# Patient Record
Sex: FEMALE | Race: WHITE | Employment: PART TIME | URBAN - METROPOLITAN AREA
[De-identification: names, ages, dates, MRNs, and addresses within clinical notes are randomized per-mention and may not be internally consistent; named-entity substitution may affect disease eponyms.]

---

## 2017-03-17 ENCOUNTER — OFFICE VISIT (OUTPATIENT)
Dept: FAMILY MEDICINE CLINIC | Age: 62
End: 2017-03-17

## 2017-03-17 VITALS
BODY MASS INDEX: 24.05 KG/M2 | DIASTOLIC BLOOD PRESSURE: 98 MMHG | HEART RATE: 79 BPM | RESPIRATION RATE: 16 BRPM | OXYGEN SATURATION: 98 % | HEIGHT: 64 IN | SYSTOLIC BLOOD PRESSURE: 151 MMHG | WEIGHT: 140.9 LBS | TEMPERATURE: 98.4 F

## 2017-03-17 DIAGNOSIS — R73.9 HYPERGLYCEMIA: ICD-10-CM

## 2017-03-17 DIAGNOSIS — R00.2 HEART PALPITATIONS: ICD-10-CM

## 2017-03-17 DIAGNOSIS — Z00.00 WELL WOMAN EXAM (NO GYNECOLOGICAL EXAM): Primary | ICD-10-CM

## 2017-03-17 DIAGNOSIS — Z23 ENCOUNTER FOR IMMUNIZATION: ICD-10-CM

## 2017-03-17 DIAGNOSIS — I10 ESSENTIAL HYPERTENSION: ICD-10-CM

## 2017-03-17 DIAGNOSIS — J30.89 SEASONAL ALLERGIC RHINITIS DUE TO OTHER ALLERGIC TRIGGER: ICD-10-CM

## 2017-03-17 DIAGNOSIS — M81.0 OSTEOPOROSIS: ICD-10-CM

## 2017-03-17 DIAGNOSIS — Z85.3 HISTORY OF LEFT BREAST CANCER: ICD-10-CM

## 2017-03-17 DIAGNOSIS — Z11.59 NEED FOR HEPATITIS C SCREENING TEST: ICD-10-CM

## 2017-03-17 DIAGNOSIS — E78.00 PURE HYPERCHOLESTEROLEMIA: ICD-10-CM

## 2017-03-17 RX ORDER — ASPIRIN 81 MG/1
81 TABLET ORAL DAILY
Qty: 90 TAB | Refills: 3
Start: 2017-03-17 | End: 2019-01-31

## 2017-03-17 RX ORDER — LORATADINE 10 MG/1
10 TABLET ORAL
COMMUNITY

## 2017-03-17 NOTE — PROGRESS NOTES
Chief Complaint   Patient presents with    Complete Physical    Medication Refill     Diltiazem    Blood Pressure Check    ED Follow-up     [facility]     1. Have you been to the ER, urgent care clinic since your last visit? Hospitalized since your last visit? Yes, [facility]. OV note located in Media section. PT stated that she had N/V taking the Z-pack that was given to her; so she stopped taking it. Added to her allergies per her request; does not want to ever take it again. 2. Have you seen or consulted any other health care providers outside of the [address] [place] [address] since your last visit? Include any pap smears or colon screening. Yes, PT went to [facility] on 01/05/2017    In the event something were to happen to you and you were unable to speak on your behalf, do you have an Advance Directive/ Living Will in place stating your wishes? PT unsure, knows she did a will, but does not know if it had a living will or adv care plan on it or not. If yes, do we have a copy on file NO    If no, would you like information:    PT offered and accepted. PT stated that she has an appointment for her colonoscopy in the near future; stated that she had not had an eye exam done recently as well. PT's Mammogram due 08/23/2018 and Pap Smear due 07/28/2017.

## 2017-03-17 NOTE — ACP (ADVANCE CARE PLANNING)
Discussed ACP with patient. Gave pt an [facility] Patient prefers to read it on her own. Declines referral to [facility] team at this time. Patient will bring document to her next office visit or attach it to her MyChart record.

## 2017-03-17 NOTE — PROGRESS NOTES
HISTORY OF PRESENT ILLNESS  Bhavani Rene is a 57 y.o. female here for an annual physical exam    Agree with nurse note. I have not seen Ms. Seng Faulkner since 03/02/16. Hypertensive, hyperglycemic pt with hypercholesterolemia and hx of heart palpitations presents to the office with a BP of 151/98. For BP, she takes Diltiazem 300 mg ER daily, tolerating well. Patient denies vision changes, headaches, dizziness, chest pain, SOB, or swelling. No recurrence of heart palpitations. She weighs 140 lbs, lost 1 lb since last ov. Pt with seasonal allergies; stable on Claritin and Nasacort daily. No fever, chills, SOB, chest pain or other associated symptoms. Health Maintenance    Pt's most recent colonoscopy was on 6/24/10 with GI, Dr. Alisson Castle. F/U 10 years. Pt is followed by optometrist, Dr. Lakia Khan. No     Pt with osteoporosis. She was previously followed by Dr. Trent Alexander who has since retired. Declines intervention today. She takes Calcium 500 mg daily. Pt is followed by GYN, Dr. Ministerio Torrez and will follow up with her this summer. Her most recent pap smear was on 7/28/14; normal.     Pt with hx of L breast cancer; no recurrence. She was previously followed by Dr. Any Pablo who performed her L breast lumpectomy. She has since been released from her care. Her most recent mammogram was on 8/23/16; normal with fibroglandular densities noted. Pt is agreeable with Hep C screening. Written by kathryn Mcgrath, as dictated by Dr. Romy Barillas DO.    MARIAM    Review of Systems is negative except as mentioned above in HPI.     ALLERGIES:    Allergies   Allergen Reactions    Percocet [Oxycodone-Acetaminophen] Nausea and Vomiting     dizziness    Prednisone Nausea and Vomiting     \"withdrawals\" and insomnia    Azithromycin Nausea and Vomiting       CURRENT MEDICATIONS:    Outpatient Prescriptions Marked as Taking for the 3/17/17 encounter (Office Visit) with Ewa Collins Maria A Herrera, DO   Medication Sig Dispense Refill    loratadine (CLARITIN) 10 mg tablet Take 10 mg by mouth.  aspirin delayed-release 81 mg tablet Take 1 Tab by mouth daily. Indications: prevention of transient ischemic attack 90 Tab 3    diltiazem CD (CARDIZEM CD) 300 mg ER capsule TAKE 1 CAPSULE BY MOUTH DAILY. INDICATIONS: HYPERTENSION, HEART PALPITATIONS 90 Cap 3    triamcinolone (NASACORT AQ) 55 mcg nasal inhaler 2 Sprays daily.  calcium 500 mg tab Take  by mouth. Unsure of exact dose.  multivitamin (ONE A DAY) tablet Take 1 Tab by mouth daily. PAST MEDICAL HISTORY:    Past Medical History:   Diagnosis Date    Breast cancer, stage 1 ([facility] [address] 2003    Ductal Carcinoma In Situ. Radiation and surgery. Dr. Vicki Lechuga. Dr. Jesus Davis.  Chest pain 07/2007    Negative Cardiolite Stress Test.  Dr. Carmen Acosta Essential hypertension, benign 07/26/07    Dr. Carmen Acosta Fracture of foot 4/30/15    5th metatarsal fracture left foot. Dr. Chriss Amin. [employer].  Heart palpitations 07/2007    Dr. Townsend Brothers disease     Dr. Raf Zee Metatarsal fracture 12/2006    Right 5th.  Osteoporosis 08/20/08    Dr. Princess Maxwell    Stress fracture of lower leg 06/11/08    Right distal stress reaction. PAST SURGICAL HISTORY:    Past Surgical History:   Procedure Laterality Date    HX BREAST LUMPECTOMY  2003    left due to cancer with radiation    HX COLONOSCOPY  06/24/2010    Dr. Nora Owusu. due q 10 yrs.     HX POLYPECTOMY  04/2008    uterine  Dr. Megan Addison  childhood    Charol Noun    Dr. Marilou Lofton       FAMILY HISTORY:    Family History   Problem Relation Age of Onset    Heart Attack Mother 72    Heart Disease Mother      CAD    High Cholesterol Mother     Other Mother      advanced Meniere's Disease    Cancer Mother 80     LUNG    Diabetes Father     High Cholesterol Sister     Seizures Sister    [address] [facility] Other Sister      MACULAR DEGENERATION    Breast Cancer Paternal Grandmother      pt was in her [de-identified]       SOCIAL HISTORY:    Social History     Social History    Marital status:      Spouse name: N/A    Number of children: N/A    Years of education: N/A     Social History Main Topics    Smoking status: Former Smoker     Types: Cigarettes     Quit date: 1/1/1972    Smokeless tobacco: Never Used      Comment: smoked x6 mos at 21 y/o    Alcohol use No    Drug use: No    Sexual activity: Yes     Partners: Male     Birth control/ protection: Surgical      Comment: TL     Other Topics Concern    None     Social History Narrative       IMMUNIZATIONS:    Immunization History   Administered Date(s) Administered    H1N1 Influenza Virus Vaccine 11/01/2009    Influenza Vaccine 10/13/2015, 01/05/2016    Influenza Vaccine Split 11/01/2010, 11/01/2011    Influenza Vaccine Whole 09/01/2009    TD Vaccine 08/01/2009         PHYSICAL EXAMINATION    Vital signs     Visit Vitals    BP (!) 151/98 (BP 1 Location: Left arm, BP Patient Position: Sitting)    Pulse 79    Temp 98.4 °F (36.9 °C) (Oral)    Resp 16    Ht 5' 3.5\" (1.613 m)    Wt 140 lb 14.4 oz (63.9 kg)    SpO2 98%    BMI 24.56 kg/m2        General appearance - Well nourished. Well appearing. Well developed. No acute distress. Head - Normocephalic. Atraumatic. Non tender sinuses x 4. Eyes - pupils equal and reactive, extraocular eye movements intact, sclera anicteric. Mildly injected sclera. Ears - Hearing is grossly normal bilaterally. R EAC is mildly erythematous at inferior aspect. BL moderate retracted TMs with yellow discoloration with good reflection to light; no erythema. Nose - normal and patent. No erythema or turbinate edema. No discharge. No polyps. Mouth - mucous membranes with adequate moisture. Posterior pharynx normal without lesions, white exudate, or obstruction. Neck - supple. Midline trachea.   No carotid bruits are noted.  No thyromegaly noted. Neck is supple without rigidity. Chest - clear to auscultation bilaterally anterriorly and posteriorly. No wheezes, rales or rhonchi. Breath sounds are symmetrical bilaterally. Unlabored respirations. Heart - normal rate. Regular rhythm. Normal S1, S2. No murmurs. No rubs, clicks or gallops noted. Abdomen - soft and nondistended. No masses or organomegaly. No rebound, rigidity or guarding. Bowel sounds normal x 4 quadrants. No tenderness noted. Back exam - normal range of motion. No pain on palpation of the spinous processes in the cervical, thoracic, lumbar, sacral regions. No CVA tenderness. Mild increased thoracic kyphosis. Neurological - awake, alert and oriented to person, place, and time and event. Cranial nerves II through XII intact. No focal findings. Clear speech. Muscle strength is +5/5 x 4 extremities. Sensation is intact to light touch bilaterally. Steady gait. Musculoskeletal - Intact x 4 extremities. Full ROM x 4 extremities. No pain with movement. No pain on palpation of the bilateral shoulders, elbows, wrists, hands. No tenderness in the pelvis, pubic bone, bilateral hips, knees, ankles. No obvious deformity  Heme/Lymph - peripheral pulses normal x 4 extremities. +5/5 dp. No peripheral edema is noted. No cervical adenopathy noted. Skin - no rashes, erythema, ecchymosis, lacerations, abrasions, suspicious moles  Psychological -   normal behavior, dress and thought processes. Good insight. Good eye contact. Normal affect. Appropriate mood. Normal speech.       DATA REVIEWED    Lab Results   Component Value Date/Time    WBC 5.1 07/29/2014 09:33 AM    HGB 12.5 07/29/2014 09:33 AM    HCT 38.6 07/29/2014 09:33 AM    PLATELET 399 13/32/6408 09:33 AM    MCV 89 07/29/2014 09:33 AM     Lab Results   Component Value Date/Time    Sodium 142 07/29/2014 09:33 AM    Potassium 4.4 07/29/2014 09:33 AM    Chloride 100 07/29/2014 09:33 AM CO2 29 07/29/2014 09:33 AM    Glucose 82 07/29/2014 09:33 AM    BUN 18 07/29/2014 09:33 AM    Creatinine 0.78 07/29/2014 09:33 AM    BUN/Creatinine ratio 23 07/29/2014 09:33 AM    GFR est AA 96 07/29/2014 09:33 AM    GFR est non-AA 83 07/29/2014 09:33 AM    Calcium 9.4 07/29/2014 09:33 AM    Bilirubin, total 0.2 07/29/2014 09:33 AM    AST (SGOT) 15 07/29/2014 09:33 AM    Alk. phosphatase 72 07/29/2014 09:33 AM    Protein, total 6.3 07/29/2014 09:33 AM    Albumin 4.3 07/29/2014 09:33 AM    A-G Ratio 2.2 07/29/2014 09:33 AM    ALT (SGPT) 15 07/29/2014 09:33 AM     Lab Results   Component Value Date/Time    Cholesterol, total 210 07/29/2014 09:33 AM    HDL Cholesterol 86 07/29/2014 09:33 AM    LDL, calculated 112 07/29/2014 09:33 AM    VLDL, calculated 12 07/29/2014 09:33 AM    Triglyceride 58 07/29/2014 09:33 AM     Lab Results   Component Value Date/Time    Vitamin D 25-Hydroxy 37.1 06/20/2011 08:19 AM    VITAMIN D, 25-HYDROXY 42.7 07/29/2014 09:33 AM       Lab Results   Component Value Date/Time    Hemoglobin A1c 5.9 07/29/2014 09:33 AM     Lab Results   Component Value Date/Time    TSH 1.200 07/29/2014 09:33 AM       ASSESSMENT and PLAN    ICD-10-CM ICD-9-CM    1. Well woman exam (no gynecological exam) Z00.00 V70.0 LIPID PANEL      METABOLIC PANEL, COMPREHENSIVE      TSH 3RD GENERATION      URINALYSIS W/ RFLX MICROSCOPIC      CBC W/O DIFF      aspirin delayed-release 81 mg tablet      MICROALBUMIN, UR, RAND W/ MICROALBUMIN/CREA RATIO   2. Pure hypercholesterolemia E78.00 272.0 LIPID PANEL      METABOLIC PANEL, COMPREHENSIVE      TSH 3RD GENERATION      URINALYSIS W/ RFLX MICROSCOPIC   3. Essential hypertension I10 401.9 LIPID PANEL      METABOLIC PANEL, COMPREHENSIVE      TSH 3RD GENERATION      MICROALBUMIN, UR, RAND W/ MICROALBUMIN/CREA RATIO   4. Hyperglycemia H84.6 825.59 METABOLIC PANEL, COMPREHENSIVE      HEMOGLOBIN A1C WITH EAG   5.  Osteoporosis M81.0 733.00 VITAMIN D, 25 HYDROXY   6. Heart palpitations R00.2 785.1     stable   7. History of left breast cancer Z85.3 V10.3     stage 1   8. Need for hepatitis C screening test Z11.59 V73.89 HCV AB W/RFLX TO JAYLEN   9. Seasonal allergic rhinitis due to other allergic trigger J30.89  loratadine (CLARITIN) 10 mg tablet     See ophthalmologist every 2 to 3 years unless otherwise directed. See dentist every 6 months. See dermatologist for annual check. Staff message sent to oncologist, Dr. Carleen Truong for curbside consult regarding shingles and TDAP vaccines after hx of breast cancer and radiation. Per pt request, will refer BMD testing to GYN in the absence of Dr. Lesli Lugo who retired. Prescriptions written and sent to pharmacist or given to patient. Diltiazem 300 mg ER. Continue current medications. Start Aspirin 81 mg daily for stroke prevention. Chart reviewed and updated. Specialist notes reviewed and appreciated. Get ov notes from Dr. Liz Bateman. Advised pt to sign medical release form at check out today. Recheck pertinent labs when fasting. Recent colonoscopy, PAP and mammograms reviewed. Recheck as instructed by specialists. Immunizations are noted. Administered pneumococcal vaccine today. Advised balanced diet and exercise. Proper rest.  Increase water and fiber. Avoid tobacco.  Decrease alcohol and caffeine. Decrease carbohydrates, increase green leafy vegetables and protein. Increase water intake. Eat 3-5 small meals daily. Increase physical activity. Relevant handouts provided and discussed with pt. Counselled pt on:  Patient health concerns. Osteoporosis, hx of L breast cancer, and BP. Advance Care Booklet given at office visit. Discussed with pt today. Declines honoring choices referral.  Discussed the patient's BMI with her. The BMI follow up plan is as follows: Pt not eligible for BMI calculation due to normal weight. Follow-up Disposition:  Return in about 6 months (around 9/17/2017) for bp, results .     Patient was offered a choice/choices in the treatment plan today. Patient expresses understanding of the plan and agrees with recommendations. Written by kathryn Alonzo, as dictated by Dr. Aura Lee DO. Documentation True and Accepted by Miya Fischer. Gerard Peck. Patient Instructions        DASH Diet: Care Instructions  Your Care Instructions  The DASH diet is an eating plan that can help lower your blood pressure. DASH stands for Dietary Approaches to Stop Hypertension. Hypertension is high blood pressure. The DASH diet focuses on eating foods that are high in calcium, potassium, and magnesium. These nutrients can lower blood pressure. The foods that are highest in these nutrients are fruits, vegetables, low-fat dairy products, nuts, seeds, and legumes. But taking calcium, potassium, and magnesium supplements instead of eating foods that are high in those nutrients does not have the same effect. The DASH diet also includes whole grains, fish, and poultry. The DASH diet is one of several lifestyle changes your doctor may recommend to lower your high blood pressure. Your doctor may also want you to decrease the amount of sodium in your diet. Lowering sodium while following the DASH diet can lower blood pressure even further than just the DASH diet alone. Follow-up care is a key part of your treatment and safety. Be sure to make and go to all appointments, and call your doctor if you are having problems. It's also a good idea to know your test results and keep a list of the medicines you take. How can you care for yourself at home? Following the DASH diet  · Eat 4 to 5 servings of fruit each day. A serving is 1 medium-sized piece of fruit, ½ cup chopped or canned fruit, 1/4 cup dried fruit, or 4 ounces (½ cup) of fruit juice. Choose fruit more often than fruit juice. · Eat 4 to 5 servings of vegetables each day.  A serving is 1 cup of lettuce or raw leafy vegetables, ½ cup of chopped or cooked vegetables, or 4 ounces (½ cup) of vegetable juice. Choose vegetables more often than vegetable juice. · Get 2 to 3 servings of low-fat and fat-free dairy each day. A serving is 8 ounces of milk, 1 cup of yogurt, or 1 ½ ounces of cheese. · Eat 6 to 8 servings of grains each day. A serving is 1 slice of bread, 1 ounce of dry cereal, or ½ cup of cooked rice, pasta, or cooked cereal. Try to choose whole-grain products as much as possible. · Limit lean meat, poultry, and fish to 2 servings each day. A serving is 3 ounces, about the size of a deck of cards. · Eat 4 to 5 servings of nuts, seeds, and legumes (cooked dried beans, lentils, and split peas) each week. A serving is 1/3 cup of nuts, 2 tablespoons of seeds, or ½ cup of cooked beans or peas. · Limit fats and oils to 2 to 3 servings each day. A serving is 1 teaspoon of vegetable oil or 2 tablespoons of salad dressing. · Limit sweets and added sugars to 5 servings or less a week. A serving is 1 tablespoon jelly or jam, ½ cup sorbet, or 1 cup of lemonade. · Eat less than 2,300 milligrams (mg) of sodium a day. If you limit your sodium to 1,500 mg a day, you can lower your blood pressure even more. Tips for success  · Start small. Do not try to make dramatic changes to your diet all at once. You might feel that you are missing out on your favorite foods and then be more likely to not follow the plan. Make small changes, and stick with them. Once those changes become habit, add a few more changes. · Try some of the following:  ¨ Make it a goal to eat a fruit or vegetable at every meal and at snacks. This will make it easy to get the recommended amount of fruits and vegetables each day. ¨ Try yogurt topped with fruit and nuts for a snack or healthy dessert. ¨ Add lettuce, tomato, cucumber, and onion to sandwiches. ¨ Combine a ready-made pizza crust with low-fat mozzarella cheese and lots of vegetable toppings.  Try using tomatoes, squash, spinach, broccoli, carrots, cauliflower, and onions. ¨ Have a variety of cut-up vegetables with a low-fat dip as an appetizer instead of chips and dip. ¨ Sprinkle sunflower seeds or chopped almonds over salads. Or try adding chopped walnuts or almonds to cooked vegetables. ¨ Try some vegetarian meals using beans and peas. Add garbanzo or kidney beans to salads. Make burritos and tacos with mashed mukherjee beans or black beans. Where can you learn more? Go to http://[employer]kandi.info/. Enter J640 in the search box to learn more about \"DASH Diet: Care Instructions. \"  Current as of: March 23, 2016  Content Version: 11.1  © 2750-6618 [employer]. Care instructions adapted under license by [employer] (which disclaims liability or warranty for this information). If you have questions about a medical condition or this instruction, always ask your healthcare professional. [address] any warranty or liability for your use of this information.

## 2017-03-17 NOTE — MR AVS SNAPSHOT
Visit Information Date & Time Provider Department Dept. Phone Encounter #  
 3/17/2017  9:15 AM Tim Fischer DO [facility] [phone] [id number] Follow-up Instructions Return in about 6 months (around 9/17/2017) for bp, results . Routing History Upcoming Health Maintenance Date Due Hepatitis C Screening 7/13/2017* DTaP/Tdap/Td series (1 - Tdap) 8/17/2017* ZOSTER VACCINE AGE 60> 9/14/2017* PAP AKA CERVICAL CYTOLOGY 7/28/2017 Pneumococcal 19-64 Highest Risk (2 of 3 - PCV13) 3/17/2018 BREAST CANCER SCRN MAMMOGRAM 8/23/2018 COLONOSCOPY 4/1/2021 *Topic was postponed. The date shown is not the original due date. Allergies as of 3/17/2017  Review Complete On: 3/17/2017 By: Tim Fischer DO Severity Noted Reaction Type Reactions Percocet [Oxycodone-acetaminophen]  01/15/2010    Nausea and Vomiting  
 dizziness Prednisone  01/15/2010    Nausea and Vomiting \"withdrawals\" and insomnia Azithromycin Low 03/17/2017   Not Verified Nausea and Vomiting Current Immunizations  Reviewed on 3/17/2017 Name Date H1N1 FLU VACCINE 11/1/2009 Influenza Vaccine 1/5/2016, 10/13/2015 Influenza Vaccine Split 11/1/2011, 11/1/2010 Influenza Vaccine Whole 9/1/2009 TD Vaccine 8/1/2009 Reviewed by Tim Fischer DO on 3/17/2017 at 10:28 AM  
You Were Diagnosed With   
  
 Codes Comments Well woman exam (no gynecological exam)    -  Primary ICD-10-CM: Z00.00 ICD-9-CM: V70.0 Pure hypercholesterolemia     ICD-10-CM: E78.00 ICD-9-CM: 272.0 Essential hypertension     ICD-10-CM: I10 
ICD-9-CM: 401.9 Hyperglycemia     ICD-10-CM: R73.9 ICD-9-CM: 790.29 Osteoporosis     ICD-10-CM: M81.0 ICD-9-CM: 733.00 Heart palpitations     ICD-10-CM: R00.2 ICD-9-CM: 785.1 stable History of left breast cancer     ICD-10-CM: Z85.3 ICD-9-CM: V10.3 stage 1 Need for hepatitis C screening test     ICD-10-CM: Z11.59 
ICD-9-CM: V73.89 Seasonal allergic rhinitis due to other allergic trigger     ICD-10-CM: J30.89 Vitals BP Pulse Temp Resp Height(growth percentile) Weight(growth percentile) (!) 151/98 (BP 1 Location: Left arm, BP Patient Position: Sitting) 79 98.4 °F (36.9 °C) (Oral) 16 5' 3.5\" (1.613 m) 140 lb 14.4 oz (63.9 kg) SpO2 BMI OB Status Smoking Status 98% 24.56 kg/m2 Postmenopausal Former Smoker Vitals History BMI and BSA Data Body Mass Index Body Surface Area 24.56 kg/m 2 1.69 m 2 Preferred Pharmacy Pharmacy Name Phone [id number] [phone] Your Updated Medication List  
  
   
This list is accurate as of: 3/17/17 10:51 AM.  Always use your most recent med list.  
  
  
  
  
 aspirin delayed-release 81 mg tablet Take 1 Tab by mouth daily. Indications: prevention of transient ischemic attack  
  
 calcium 500 mg Tab Take  by mouth. Unsure of exact dose. CLARITIN 10 mg tablet Generic drug:  loratadine Take 10 mg by mouth. dilTIAZem  mg ER capsule Commonly known as:  CARDIZEM CD  
TAKE 1 CAPSULE BY MOUTH DAILY. INDICATIONS: HYPERTENSION, HEART PALPITATIONS  
  
 multivitamin tablet Commonly known as:  ONE A DAY Take 1 Tab by mouth daily. NASACORT AQ 55 mcg nasal inhaler Generic drug:  triamcinolone 2 Sprays daily. We Performed the Following CBC W/O DIFF [55676 CPT(R)] HCV AB W/RFLX TO JAYLEN [01564 CPT(R)] HEMOGLOBIN A1C WITH EAG [52068 CPT(R)] LIPID PANEL [81978 CPT(R)] METABOLIC PANEL, COMPREHENSIVE [45132 CPT(R)] MICROALBUMIN, UR, RAND W/ MICROALBUMIN/CREA RATIO [id number] CPT(R)] TSH 3RD GENERATION [85323 CPT(R)] URINALYSIS W/ RFLX MICROSCOPIC [86975 CPT(R)] VITAMIN D, 25 HYDROXY [id number] CPT(R)] Follow-up Instructions Return in about 6 months (around 9/17/2017) for bp, results . Patient Instructions DASH Diet: Care Instructions Your Care Instructions The DASH diet is an eating plan that can help lower your blood pressure. DASH stands for Dietary Approaches to Stop Hypertension. Hypertension is high blood pressure. The DASH diet focuses on eating foods that are high in calcium, potassium, and magnesium. These nutrients can lower blood pressure. The foods that are highest in these nutrients are fruits, vegetables, low-fat dairy products, nuts, seeds, and legumes. But taking calcium, potassium, and magnesium supplements instead of eating foods that are high in those nutrients does not have the same effect. The DASH diet also includes whole grains, fish, and poultry. The DASH diet is one of several lifestyle changes your doctor may recommend to lower your high blood pressure. Your doctor may also want you to decrease the amount of sodium in your diet. Lowering sodium while following the DASH diet can lower blood pressure even further than just the DASH diet alone. Follow-up care is a key part of your treatment and safety. Be sure to make and go to all appointments, and call your doctor if you are having problems. It's also a good idea to know your test results and keep a list of the medicines you take. How can you care for yourself at home? Following the DASH diet · Eat 4 to 5 servings of fruit each day. A serving is 1 medium-sized piece of fruit, ½ cup chopped or canned fruit, 1/4 cup dried fruit, or 4 ounces (½ cup) of fruit juice. Choose fruit more often than fruit juice. · Eat 4 to 5 servings of vegetables each day. A serving is 1 cup of lettuce or raw leafy vegetables, ½ cup of chopped or cooked vegetables, or 4 ounces (½ cup) of vegetable juice. Choose vegetables more often than vegetable juice. · Get 2 to 3 servings of low-fat and fat-free dairy each day.  A serving is 8 ounces of milk, 1 cup of yogurt, or 1 ½ ounces of cheese. · Eat 6 to 8 servings of grains each day. A serving is 1 slice of bread, 1 ounce of dry cereal, or ½ cup of cooked rice, pasta, or cooked cereal. Try to choose whole-grain products as much as possible. · Limit lean meat, poultry, and fish to 2 servings each day. A serving is 3 ounces, about the size of a deck of cards. · Eat 4 to 5 servings of nuts, seeds, and legumes (cooked dried beans, lentils, and split peas) each week. A serving is 1/3 cup of nuts, 2 tablespoons of seeds, or ½ cup of cooked beans or peas. · Limit fats and oils to 2 to 3 servings each day. A serving is 1 teaspoon of vegetable oil or 2 tablespoons of salad dressing. · Limit sweets and added sugars to 5 servings or less a week. A serving is 1 tablespoon jelly or jam, ½ cup sorbet, or 1 cup of lemonade. · Eat less than 2,300 milligrams (mg) of sodium a day. If you limit your sodium to 1,500 mg a day, you can lower your blood pressure even more. Tips for success · Start small. Do not try to make dramatic changes to your diet all at once. You might feel that you are missing out on your favorite foods and then be more likely to not follow the plan. Make small changes, and stick with them. Once those changes become habit, add a few more changes. · Try some of the following: ¨ Make it a goal to eat a fruit or vegetable at every meal and at snacks. This will make it easy to get the recommended amount of fruits and vegetables each day. ¨ Try yogurt topped with fruit and nuts for a snack or healthy dessert. ¨ Add lettuce, tomato, cucumber, and onion to sandwiches. ¨ Combine a ready-made pizza crust with low-fat mozzarella cheese and lots of vegetable toppings. Try using tomatoes, squash, spinach, broccoli, carrots, cauliflower, and onions. ¨ Have a variety of cut-up vegetables with a low-fat dip as an appetizer instead of chips and dip. ¨ Sprinkle sunflower seeds or chopped almonds over salads. Or try adding chopped walnuts or almonds to cooked vegetables. ¨ Try some vegetarian meals using beans and peas. Add garbanzo or kidney beans to salads. Make burritos and tacos with mashed mukherjee beans or black beans. Where can you learn more? Go to http://margo-kandi.info/. Enter G811 in the search box to learn more about \"DASH Diet: Care Instructions. \" Current as of: March 23, 2016 Content Version: 11.1 © 0449-4227 [employer]. Care instructions adapted under license by H[employer] (which disclaims liability or warranty for this information). If you have questions about a medical condition or this instruction, always ask your healthcare professional. [address] any warranty or liability for your use of this information. Introducing [facility]! Dear Carol Ayon: 
Thank you for requesting a [employer] account. Our records indicate that you already have an active [employer] account. You can access your account anytime at [email or web address]. [employer] Did you know that you can access your hospital and ER discharge instructions at any time in [employer]? You can also review all of your test results from your hospital stay or ER visit. Additional Information If you have questions, please visit the Frequently Asked Questions section of the [employer] website at [email or web address]/. Remember, [employer] is NOT to be used for urgent needs. For medical emergencies, dial 911. Now available from your iPhone and Android! Please provide this summary of care documentation to your next provider. Your primary care clinician is listed as [place]. If you have any questions after today's visit, please call [phone].

## 2017-03-17 NOTE — Clinical Note
Hi Dr. Fouzia Aguirre! Would you mind a curbside consult? 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 Wonders if she can now get the Shingles Vaccine. What are oncologists recommending?   Thanks, Charmaine Merrill

## 2017-03-17 NOTE — PATIENT INSTRUCTIONS

## 2017-04-18 NOTE — PROGRESS NOTES
MD Jerry Cagle, DO Navin Zhang,   The shingles vaccine shouldn't be a problem with a history of DCIS.  I think of DCIS as analogous to a colon polyp. It is a premalignant lesion.  Stage 0 cancer. Anyway, if there is active malignancy we don't like the shingles vaccine because it is a live virus. Bushra Los should be fine with distant history of DCIS. Vandana Dahl       Will have nurse to notify pt and send Rx for Shingles vaccine.

## 2017-04-21 DIAGNOSIS — R00.2 HEART PALPITATIONS: ICD-10-CM

## 2017-04-24 RX ORDER — DILTIAZEM HYDROCHLORIDE 300 MG/1
CAPSULE, COATED, EXTENDED RELEASE ORAL
Qty: 90 CAP | Refills: 3 | Status: SHIPPED | OUTPATIENT
Start: 2017-04-24 | End: 2017-09-29 | Stop reason: DRUGHIGH

## 2017-08-24 ENCOUNTER — HOSPITAL ENCOUNTER (OUTPATIENT)
Dept: MAMMOGRAPHY | Age: 62
Discharge: HOME | End: 2017-08-24
Attending: OBSTETRICS & GYNECOLOGY
Payer: COMMERCIAL

## 2017-08-24 DIAGNOSIS — Z12.31 VISIT FOR SCREENING MAMMOGRAM: ICD-10-CM

## 2017-08-24 PROCEDURE — 77067 SCR MAMMO BI INCL CAD: CPT

## 2017-09-29 ENCOUNTER — OFFICE VISIT (OUTPATIENT)
Dept: FAMILY MEDICINE CLINIC | Age: 62
End: 2017-09-29

## 2017-09-29 VITALS
OXYGEN SATURATION: 97 % | RESPIRATION RATE: 16 BRPM | WEIGHT: 143.1 LBS | BODY MASS INDEX: 24.43 KG/M2 | HEART RATE: 75 BPM | HEIGHT: 64 IN | TEMPERATURE: 98.6 F | DIASTOLIC BLOOD PRESSURE: 82 MMHG | SYSTOLIC BLOOD PRESSURE: 130 MMHG

## 2017-09-29 DIAGNOSIS — Z23 NEED FOR SHINGLES VACCINE: ICD-10-CM

## 2017-09-29 DIAGNOSIS — R73.9 HYPERGLYCEMIA: ICD-10-CM

## 2017-09-29 DIAGNOSIS — E78.00 PURE HYPERCHOLESTEROLEMIA: Primary | ICD-10-CM

## 2017-09-29 DIAGNOSIS — Z23 ENCOUNTER FOR IMMUNIZATION: ICD-10-CM

## 2017-09-29 DIAGNOSIS — R00.2 HEART PALPITATIONS: ICD-10-CM

## 2017-09-29 DIAGNOSIS — Z85.3 HISTORY OF LEFT BREAST CANCER: ICD-10-CM

## 2017-09-29 DIAGNOSIS — Z11.59 NEED FOR HEPATITIS C SCREENING TEST: ICD-10-CM

## 2017-09-29 DIAGNOSIS — M81.0 AGE-RELATED OSTEOPOROSIS WITHOUT CURRENT PATHOLOGICAL FRACTURE: ICD-10-CM

## 2017-09-29 DIAGNOSIS — I10 ESSENTIAL HYPERTENSION: ICD-10-CM

## 2017-09-29 DIAGNOSIS — R23.3 EASY BRUISABILITY: ICD-10-CM

## 2017-09-29 RX ORDER — DILTIAZEM HYDROCHLORIDE 360 MG/1
360 CAPSULE, EXTENDED RELEASE ORAL DAILY
Qty: 90 CAP | Refills: 3 | Status: SHIPPED | OUTPATIENT
Start: 2017-09-29 | End: 2018-09-15 | Stop reason: SDUPTHER

## 2017-09-29 NOTE — PATIENT INSTRUCTIONS
DASH Diet: Care Instructions  Your Care Instructions  The DASH diet is an eating plan that can help lower your blood pressure. DASH stands for Dietary Approaches to Stop Hypertension. Hypertension is high blood pressure. The DASH diet focuses on eating foods that are high in calcium, potassium, and magnesium. These nutrients can lower blood pressure. The foods that are highest in these nutrients are fruits, vegetables, low-fat dairy products, nuts, seeds, and legumes. But taking calcium, potassium, and magnesium supplements instead of eating foods that are high in those nutrients does not have the same effect. The DASH diet also includes whole grains, fish, and poultry. The DASH diet is one of several lifestyle changes your doctor may recommend to lower your high blood pressure. Your doctor may also want you to decrease the amount of sodium in your diet. Lowering sodium while following the DASH diet can lower blood pressure even further than just the DASH diet alone. Follow-up care is a key part of your treatment and safety. Be sure to make and go to all appointments, and call your doctor if you are having problems. It's also a good idea to know your test results and keep a list of the medicines you take. How can you care for yourself at home? Following the DASH diet  · Eat 4 to 5 servings of fruit each day. A serving is 1 medium-sized piece of fruit, ½ cup chopped or canned fruit, 1/4 cup dried fruit, or 4 ounces (½ cup) of fruit juice. Choose fruit more often than fruit juice. · Eat 4 to 5 servings of vegetables each day. A serving is 1 cup of lettuce or raw leafy vegetables, ½ cup of chopped or cooked vegetables, or 4 ounces (½ cup) of vegetable juice. Choose vegetables more often than vegetable juice. · Get 2 to 3 servings of low-fat and fat-free dairy each day. A serving is 8 ounces of milk, 1 cup of yogurt, or 1 ½ ounces of cheese. · Eat 6 to 8 servings of grains each day.  A serving is 1 slice of bread, 1 ounce of dry cereal, or ½ cup of cooked rice, pasta, or cooked cereal. Try to choose whole-grain products as much as possible. · Limit lean meat, poultry, and fish to 2 servings each day. A serving is 3 ounces, about the size of a deck of cards. · Eat 4 to 5 servings of nuts, seeds, and legumes (cooked dried beans, lentils, and split peas) each week. A serving is 1/3 cup of nuts, 2 tablespoons of seeds, or ½ cup of cooked beans or peas. · Limit fats and oils to 2 to 3 servings each day. A serving is 1 teaspoon of vegetable oil or 2 tablespoons of salad dressing. · Limit sweets and added sugars to 5 servings or less a week. A serving is 1 tablespoon jelly or jam, ½ cup sorbet, or 1 cup of lemonade. · Eat less than 2,300 milligrams (mg) of sodium a day. If you limit your sodium to 1,500 mg a day, you can lower your blood pressure even more. Tips for success  · Start small. Do not try to make dramatic changes to your diet all at once. You might feel that you are missing out on your favorite foods and then be more likely to not follow the plan. Make small changes, and stick with them. Once those changes become habit, add a few more changes. · Try some of the following:  ¨ Make it a goal to eat a fruit or vegetable at every meal and at snacks. This will make it easy to get the recommended amount of fruits and vegetables each day. ¨ Try yogurt topped with fruit and nuts for a snack or healthy dessert. ¨ Add lettuce, tomato, cucumber, and onion to sandwiches. ¨ Combine a ready-made pizza crust with low-fat mozzarella cheese and lots of vegetable toppings. Try using tomatoes, squash, spinach, broccoli, carrots, cauliflower, and onions. ¨ Have a variety of cut-up vegetables with a low-fat dip as an appetizer instead of chips and dip. ¨ Sprinkle sunflower seeds or chopped almonds over salads. Or try adding chopped walnuts or almonds to cooked vegetables. ¨ Try some vegetarian meals using beans and peas. Add garbanzo or kidney beans to salads. Make burritos and tacos with mashed mukherjee beans or black beans. Where can you learn more? Go to http://margo-kandi.info/. Enter Z136 in the search box to learn more about \"DASH Diet: Care Instructions. \"  Current as of: April 3, 2017  Content Version: 11.3  © 7531-2989 [employer]. Care instructions adapted under license by [employer]um[employer]ing (which disclaims liability or warranty for this information). If you have questions about a medical condition or this instruction, always ask your healthcare professional. [place] [address] any warranty or liability for your use of this information. Check BP twice weekly. Notify me if it consistently is above 140/90 or below 90/60. Bring your blood pressure log to your next office visit. Decrease salt, fats, caffeine, alcohol in your diet. Increase water, fiber. Exercise 5 times weekly for 30 minutes daily as tolerated. Continue current medications, care and subspecialty follow up. Visit eye doctor yearly to check your eyes blood pressure related changes. SALT    1 tsp salt = 2300 mg sodium    The average daily intake of sodium in 3year old and older = 3436 mg    77% sodium intake comes from eating PROCESSED FOODS. AVOID THE   \"SALTY SEVEN\"   to improve Blood Pressure and Swelling in the body. 1.  BREADS AND ROLLS   2. COLD CUTS AND CURED MEATS   3. PIZZA   4. POULTRY   5. SOUP   6. SANDWICHES   7. SAUCES:  HOT SAUCE AND SOY SAUCE               Learning About High Blood Sugar  What is high blood sugar? Your body turns the food you eat into glucose (sugar), which it uses for energy. But if your body isn't able to use the sugar right away, it can build up in your blood and lead to high blood sugar. When the amount of sugar in your blood stays too high for too much of the time, you may have diabetes.  Diabetes is a disease that can cause serious health problems. The good news is that lifestyle changes may help you get your blood sugar back to normal and avoid or delay diabetes. What causes high blood sugar? Sugar (glucose) can build up in your blood if you:  · Are overweight. · Have a family history of diabetes. · Take certain medicines, such as steroids. What are the symptoms? Having high blood sugar may not cause any symptoms at all. Or it may make you feel very thirsty or very hungry. You may also urinate more often than usual, have blurry vision, or lose weight without trying. How is high blood sugar treated? You can take steps to lower your blood sugar level if you understand what makes it get higher. Your doctor may want you to learn how to test your blood sugar level at home. Then you can see how illness, stress, or different kinds of food or medicine raise or lower your blood sugar level. Other tests may be needed to see if you have diabetes. How can you prevent high blood sugar? · Watch your weight. If you're overweight, losing just a small amount of weight may help. Reducing fat around your waist is most important. · Limit the amount of calories, sweets, and unhealthy fat you eat. Ask your doctor if a dietitian can help you. A registered dietitian can help you create meal plans that fit your lifestyle. · Get at least 30 minutes of exercise on most days of the week. Exercise helps control your blood sugar. It also helps you maintain a healthy weight. Walking is a good choice. You also may want to do other activities, such as running, swimming, cycling, or playing tennis or team sports. · If your doctor prescribed medicines, take them exactly as prescribed. Call your doctor if you think you are having a problem with your medicine. You will get more details on the specific medicines your doctor prescribes. Follow-up care is a key part of your treatment and safety.  Be sure to make and go to all appointments, and call your doctor if you are having problems. It's also a good idea to know your test results and keep a list of the medicines you take. Where can you learn more? Go to http://margo-kandi.info/. Enter O108 in the search box to learn more about \"Learning About High Blood Sugar. \"  Current as of: March 13, 2017  Content Version: 11.3  © 3092-8693 [employer]. Care instructions adapted under license by KnowledgeMill (which disclaims liability or warranty for this information). If you have questions about a medical condition or this instruction, always ask your healthcare professional. [address] any warranty or liability for your use of this information. Learning About High Blood Sugar  What is high blood sugar? Your body turns the food you eat into glucose (sugar), which it uses for energy. But if your body isn't able to use the sugar right away, it can build up in your blood and lead to high blood sugar. When the amount of sugar in your blood stays too high for too much of the time, you may have diabetes. Diabetes is a disease that can cause serious health problems. The good news is that lifestyle changes may help you get your blood sugar back to normal and avoid or delay diabetes. What causes high blood sugar? Sugar (glucose) can build up in your blood if you:  · Are overweight. · Have a family history of diabetes. · Take certain medicines, such as steroids. What are the symptoms? Having high blood sugar may not cause any symptoms at all. Or it may make you feel very thirsty or very hungry. You may also urinate more often than usual, have blurry vision, or lose weight without trying. How is high blood sugar treated? You can take steps to lower your blood sugar level if you understand what makes it get higher. Your doctor may want you to learn how to test your blood sugar level at home.  Then you can see how illness, stress, or different kinds of food or medicine raise or lower your blood sugar level. Other tests may be needed to see if you have diabetes. How can you prevent high blood sugar? · Watch your weight. If you're overweight, losing just a small amount of weight may help. Reducing fat around your waist is most important. · Limit the amount of calories, sweets, and unhealthy fat you eat. Ask your doctor if a dietitian can help you. A registered dietitian can help you create meal plans that fit your lifestyle. · Get at least 30 minutes of exercise on most days of the week. Exercise helps control your blood sugar. It also helps you maintain a healthy weight. Walking is a good choice. You also may want to do other activities, such as running, swimming, cycling, or playing tennis or team sports. · If your doctor prescribed medicines, take them exactly as prescribed. Call your doctor if you think you are having a problem with your medicine. You will get more details on the specific medicines your doctor prescribes. Follow-up care is a key part of your treatment and safety. Be sure to make and go to all appointments, and call your doctor if you are having problems. It's also a good idea to know your test results and keep a list of the medicines you take. Where can you learn more? Go to http://margo-kandi.info/. Enter O108 in the search box to learn more about \"Learning About High Blood Sugar. \"  Current as of: March 13, 2017  Content Version: 11.3  © 2900-7281 [employer]. Care instructions adapted under license by [employer] (which disclaims liability or warranty for this information). If you have questions about a medical condition or this instruction, always ask your healthcare professional. [address] any warranty or liability for your use of this information.

## 2017-09-29 NOTE — PROGRESS NOTES
HISTORY OF PRESENT ILLNESS  Dakota Whitten is a 60 y.o. female presents with Blood Pressure Check; Results; and Immunization/Injection    Agree with nurse note. Hypertensive, hyperglycemic pt with hx of heart palpitations presents to the office with a BP of 151/91. For BP, she takes Cardizem 300 mg daily, tolerating well. Patient denies vision changes, headaches, heart palpitations, dizziness, chest pain, SOB, or swelling. She weighs 143 lbs, gained 3 lbs since 03/2017. She requests her most recent labs from 09/25/17. TSH 1.7. Hgb A1C 5.7. . CMP WNL. She has decreased her intake of sweets. She tried taking Aspirin 81 mg daily but found she was bruising easily. She decreased to a 1/2 tab and is tolerating that well. Health Maintenance    Pt's most recent colonoscopy was on 06/24/10 with GI, Dr. Gisselle Urbina. F/U 10 years. She saw optometrist, Dr. Ligia Douglas on 06/28/17. Pt with osteoporosis and hx of L breast cancer. Her most recent mammogram was on 08/24/17; normal.  Her GYN, Dr. Juarez Child is managing osteoporosis. She has an order for her BMD.    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 Hematologist/Oncologist, Dr. Neftaly Galan kindly gave a curbside consult and in his opinion, she should be fine to get the shingles vaccine due to her distant hx of DCIS. Written by kathryn Jackman, as dictated by Dr. Paul Pryor DO.    ROS    Review of Systems negative except as noted above in HPI.     ALLERGIES:    Allergies   Allergen Reactions    Percocet [Oxycodone-Acetaminophen] Nausea and Vomiting     dizziness    Prednisone Nausea and Vomiting     \"withdrawals\" and insomnia    Azithromycin Nausea and Vomiting       CURRENT MEDICATIONS:    Outpatient Prescriptions Marked as Taking for the 9/29/17 encounter (Office Visit) with Katie Oconnell DO   Medication Sig Dispense Refill    varicella zoster vacine live (ZOSTAVAX) 19,400 unit/0.65 mL susr injection 1 Vial by SubCUTAneous route once for 1 dose. Indications: PREVENTION OF HERPES ZOSTER 0.65 mL 0    dilTIAZem (TIAZAC) 360 mg ER capsule Take 1 Cap by mouth daily. Indications: hypertension, heart palpitations 90 Cap 3    loratadine (CLARITIN) 10 mg tablet Take 10 mg by mouth.  triamcinolone (NASACORT AQ) 55 mcg nasal inhaler 2 Sprays daily.  calcium 500 mg tab Take  by mouth. Unsure of exact dose.  multivitamin (ONE A DAY) tablet Take 1 Tab by mouth daily. PAST MEDICAL HISTORY:    Past Medical History:   Diagnosis Date    Breast cancer, stage 1 ([facility] [address] 2003    Ductal Carcinoma In Situ. Radiation and surgery. Dr. Bryan Herr. Dr. Lori Mojica.  Chest pain 07/2007    Negative Cardiolite Stress Test.  Dr. Jairo Guerrero Essential hypertension, benign 07/26/07    Dr. Jairo Guerrero Fracture of foot 4/30/15    5th metatarsal fracture left foot. Dr. Dominick Griffiths. [employer]. 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 Kirby Orozco Metatarsal fracture 12/2006    Right 5th.  Osteoporosis 08/20/08    Dr. Hesham Lamas S/P radiation therapy 2003    Stress fracture of lower leg 06/11/08    Right distal stress reaction. PAST SURGICAL HISTORY:    Past Surgical History:   Procedure Laterality Date    HX BREAST LUMPECTOMY  2003    left due to cancer with radiation    HX COLONOSCOPY  06/24/2010    Dr. Keo Michele. due q 10 yrs.     HX POLYPECTOMY  04/2008    uterine  Dr. Gonzalez El  childhood    Katerine Cowper    Dr. Matt Vidal       FAMILY HISTORY:    Family History   Problem Relation Age of Onset    Heart Attack Mother 72    Heart Disease Mother      CAD    High Cholesterol Mother     Other Mother      advanced Meniere's Disease    Cancer Mother 80     LUNG    Diabetes Father     High Cholesterol Sister     Seizures Sister     Other Sister      MACULAR DEGENERATION  Breast Cancer Paternal Grandmother      pt was in her [de-identified]       SOCIAL HISTORY:    Social History     Social History    Marital status:      Spouse name: N/A    Number of children: N/A    Years of education: N/A     Social History Main Topics    Smoking status: Former Smoker     Types: Cigarettes     Quit date: 1/1/1972    Smokeless tobacco: Never Used      Comment: smoked x6 mos at 21 y/o    Alcohol use No    Drug use: No    Sexual activity: Yes     Partners: Male     Birth control/ protection: Surgical      Comment: TL     Other Topics Concern    None     Social History Narrative       IMMUNIZATIONS:    Immunization History   Administered Date(s) Administered    H1N1 Influenza Virus Vaccine 11/01/2009    Influenza Vaccine 10/13/2015, 01/05/2016    Influenza Vaccine Split 11/01/2010, 11/01/2011    Influenza Vaccine Whole 09/01/2009    Pneumococcal Polysaccharide (PPSV-23) 03/17/2017    TD Vaccine 08/01/2009         PHYSICAL EXAMINATION    Vital Signs    Visit Vitals    /82 (BP 1 Location: Right arm, BP Patient Position: Sitting)  Comment: taken manually with large cuff    Pulse 75    Temp 98.6 °F (37 °C) (Oral)    Resp 16    Ht 5' 3.5\" (1.613 m)    Wt 143 lb 1.6 oz (64.9 kg)    SpO2 97%    BMI 24.95 kg/m2       Weight Metrics 9/29/2017 3/17/2017 3/2/2016 1/2/2015 8/1/2014 2/19/2014 8/27/2013   Weight 143 lb 1.6 oz 140 lb 14.4 oz 141 lb 140 lb 3.2 oz 131 lb 11.2 oz 132 lb 6.4 oz 135 lb   BMI 24.95 kg/m2 24.56 kg/m2 24.58 kg/m2 24.44 kg/m2 22.96 kg/m2 23.46 kg/m2 23.92 kg/m2       General appearance - Well nourished. Well appearing. Well developed. No acute distress. Head - Normocephalic. Atraumatic. Eyes - pupils equal and reactive. Extraocular eye movements intact. Sclera anicteric. Mildly injected sclera. Ears - Hearing is grossly normal bilaterally. Nose - normal and patent. No polyps noted. No erythema. No discharge.     Mouth - mucous membranes with adequate moisture. Posterior pharynx normal with cobblestone appearance. No erythema, white exudate or obstruction. Neck - supple. Midline trachea. No carotid bruits noted bilaterally. No thyromegaly noted. Chest - clear to auscultation bilaterally anteriorly and posteriorly. No wheezes. No rales or rhonchi. Breath sounds are symmetrical bilaterally. Unlabored respirations. Heart - normal rate. Regular rhythm. Normal S1, S2. No murmur noted. No rubs, clicks or gallops noted. Abdomen - soft and nondistended. No masses or organomegaly. No rebound, rigidity or guarding. Bowel sounds normal x 4 quadrants. No tenderness noted. Neurological - awake, alert and oriented to person, place, and time and event. Cranial nerves II through XII intact. Clear speech. Muscle strength is +5/5 x 4 extremities. Sensation is intact to light touch bilaterally. Steady gait. Heme/Lymph - peripheral pulses normal x 4 extremities. No peripheral edema is noted. Musculoskeletal - Intact x 4 extremities. Full ROM x 4 extremities. No pain with movement. Back exam - normal range of motion. No pain on palpation of the spinous processes in the cervical, thoracic, lumbar, sacral regions. No CVA tenderness. Increased thoracic kyphosis. Skin - no rashes, erythema, ecchymosis, lacerations, abrasions, suspicious moles noted  Psychological -   normal behavior, dress and thought processes. Good insight. Good eye contact. Normal affect. Appropriate mood. Normal speech.       DATA REVIEWED    Results for orders placed or performed in visit on 32/85/25   METABOLIC PANEL, COMPREHENSIVE   Result Value Ref Range    Glucose 86 65 - 99 mg/dL    BUN 14 8 - 27 mg/dL    Creatinine 0.74 0.57 - 1.00 mg/dL    GFR est non-AA 86 >59 mL/min/1.73    GFR est  >59 mL/min/1.73    BUN/Creatinine ratio 19 12 - 28    Sodium 143 134 - 144 mmol/L    Potassium 4.3 3.5 - 5.2 mmol/L    Chloride 103 96 - 106 mmol/L    CO2 27 18 - 29 mmol/L    Calcium 9.3 8.7 - 10.3 mg/dL    Protein, total 6.5 6.0 - 8.5 g/dL    Albumin 4.3 3.6 - 4.8 g/dL    GLOBULIN, TOTAL 2.2 1.5 - 4.5 g/dL    A-G Ratio 2.0 1.2 - 2.2    Bilirubin, total 0.3 0.0 - 1.2 mg/dL    Alk. phosphatase 84 39 - 117 IU/L    AST (SGOT) 17 0 - 40 IU/L    ALT (SGPT) 14 0 - 32 IU/L   LIPID PANEL   Result Value Ref Range    Cholesterol, total 222 (H) 100 - 199 mg/dL    Triglyceride 87 0 - 149 mg/dL    HDL Cholesterol 89 >39 mg/dL    VLDL, calculated 17 5 - 40 mg/dL    LDL, calculated 116 (H) 0 - 99 mg/dL   HEMOGLOBIN A1C   Result Value Ref Range    Hemoglobin A1c 5.7 (H) 4.8 - 5.6 %    Estimated average glucose 117 mg/dL   TSH, 3RD GENERATION   Result Value Ref Range    TSH 1.700 0.450 - 4.500 uIU/mL   CVD REPORT   Result Value Ref Range    INTERPRETATION Note        ASSESSMENT and PLAN      ICD-10-CM ICD-9-CM    1. Pure hypercholesterolemia E78.00 272.0     unchanged   2. Essential hypertension I10 401.9 dilTIAZem (TIAZAC) 360 mg ER capsule      MICROALBUMIN, UR, RAND W/ MICROALBUMIN/CREA RATIO      URINALYSIS W/ RFLX MICROSCOPIC    slightly elevated   3. Hyperglycemia R73.9 790.29     improving due to no chocolate   4. Heart palpitations R00.2 785.1 dilTIAZem (TIAZAC) 360 mg ER capsule    stable on Cardizem 300 mg daily   5. Easy bruisability R23.8 782.9 CBC WITH AUTOMATED DIFF    worse since starting ASA 81 mg, improving since taking 1/2 dose   6. Age-related osteoporosis without current pathological fracture M81.0 733.01 VITAMIN D, 25 HYDROXY   7. History of left breast cancer Z85.3 V10.3     DCIS, resolved without recurrence   8. Need for shingles vaccine Z23 V04.89 varicella zoster vacine live (ZOSTAVAX) 19,400 unit/0.65 mL susr injection   9. Need for hepatitis C screening test Z11.59 V73.89 HEPATITIS C AB   10. Encounter for immunization Z23 V03.89 INFLUENZA VIRUS VAC QUAD,SPLIT,PRESV FREE SYRINGE IM       Discussed the patient's BMI with her. The BMI follow up plan is as follows:  Pt not eligible for BMI calculation due to normal weight. Decrease carbohydrates (white foods, sweet foods, sweet drinks and alcohol), increase green leafy vegetables and protein (lean meats and beans) with each meal.  Avoid fried foods. Eat 3-5 small meals daily. Do not skip meals. Increase water intake. Increase physical activity to 30 minutes daily for health benefit or 60 minutes daily to prevent weight regain, as tolerated. Get 7-8 hours uninterrupted sleep nightly. Chart reviewed and updated. Continue current medications and care. Increase Cardizem from 300 mg to Diltiazem 360 mg for better bp control. She will work on diet and exercise for cholesterol; if no improvement, will consider statin therapy. Prescriptions written and sent to pharmacy; medication side effects discussed. Diltiazem 360 mg   Most recent tests reviewed from 09/25/17. Recheck pertinent labs. Get recent office visit notes from Dr. Eros Oseguera and Dr. Cristina Berrios. Advised pt to sign release. Counseled patient on health concerns:  BP, cholesterol, and hyperglycemia. Relevant handouts given and discussed with patient. Immunizations noted. Administered flu vaccine today. Rx written for shingles vaccine. Offered empathy, support, legitimation, prayers, partnership to patient. Praised patient for progress. Follow-up Disposition:  Return in about 3 months (around 12/29/2017) for bp check, results . Patient was offered a choice/choices in the treatment plan today. Patient expresses understanding of the plan and agrees with recommendations. Written by kathryn Gould, as dictated by Dr. Perez Terrell DO. Documentation True and Accepted by Shahab Redd. Blu Guzman. Patient Instructions        DASH Diet: Care Instructions  Your Care Instructions  The DASH diet is an eating plan that can help lower your blood pressure. DASH stands for Dietary Approaches to Stop Hypertension.  Hypertension is high blood pressure. The DASH diet focuses on eating foods that are high in calcium, potassium, and magnesium. These nutrients can lower blood pressure. The foods that are highest in these nutrients are fruits, vegetables, low-fat dairy products, nuts, seeds, and legumes. But taking calcium, potassium, and magnesium supplements instead of eating foods that are high in those nutrients does not have the same effect. The DASH diet also includes whole grains, fish, and poultry. The DASH diet is one of several lifestyle changes your doctor may recommend to lower your high blood pressure. Your doctor may also want you to decrease the amount of sodium in your diet. Lowering sodium while following the DASH diet can lower blood pressure even further than just the DASH diet alone. Follow-up care is a key part of your treatment and safety. Be sure to make and go to all appointments, and call your doctor if you are having problems. It's also a good idea to know your test results and keep a list of the medicines you take. How can you care for yourself at home? Following the DASH diet  · Eat 4 to 5 servings of fruit each day. A serving is 1 medium-sized piece of fruit, ½ cup chopped or canned fruit, 1/4 cup dried fruit, or 4 ounces (½ cup) of fruit juice. Choose fruit more often than fruit juice. · Eat 4 to 5 servings of vegetables each day. A serving is 1 cup of lettuce or raw leafy vegetables, ½ cup of chopped or cooked vegetables, or 4 ounces (½ cup) of vegetable juice. Choose vegetables more often than vegetable juice. · Get 2 to 3 servings of low-fat and fat-free dairy each day. A serving is 8 ounces of milk, 1 cup of yogurt, or 1 ½ ounces of cheese. · Eat 6 to 8 servings of grains each day. A serving is 1 slice of bread, 1 ounce of dry cereal, or ½ cup of cooked rice, pasta, or cooked cereal. Try to choose whole-grain products as much as possible. · Limit lean meat, poultry, and fish to 2 servings each day.  A serving is 3 ounces, about the size of a deck of cards. · Eat 4 to 5 servings of nuts, seeds, and legumes (cooked dried beans, lentils, and split peas) each week. A serving is 1/3 cup of nuts, 2 tablespoons of seeds, or ½ cup of cooked beans or peas. · Limit fats and oils to 2 to 3 servings each day. A serving is 1 teaspoon of vegetable oil or 2 tablespoons of salad dressing. · Limit sweets and added sugars to 5 servings or less a week. A serving is 1 tablespoon jelly or jam, ½ cup sorbet, or 1 cup of lemonade. · Eat less than 2,300 milligrams (mg) of sodium a day. If you limit your sodium to 1,500 mg a day, you can lower your blood pressure even more. Tips for success  · Start small. Do not try to make dramatic changes to your diet all at once. You might feel that you are missing out on your favorite foods and then be more likely to not follow the plan. Make small changes, and stick with them. Once those changes become habit, add a few more changes. · Try some of the following:  ¨ Make it a goal to eat a fruit or vegetable at every meal and at snacks. This will make it easy to get the recommended amount of fruits and vegetables each day. ¨ Try yogurt topped with fruit and nuts for a snack or healthy dessert. ¨ Add lettuce, tomato, cucumber, and onion to sandwiches. ¨ Combine a ready-made pizza crust with low-fat mozzarella cheese and lots of vegetable toppings. Try using tomatoes, squash, spinach, broccoli, carrots, cauliflower, and onions. ¨ Have a variety of cut-up vegetables with a low-fat dip as an appetizer instead of chips and dip. ¨ Sprinkle sunflower seeds or chopped almonds over salads. Or try adding chopped walnuts or almonds to cooked vegetables. ¨ Try some vegetarian meals using beans and peas. Add garbanzo or kidney beans to salads. Make burritos and tacos with mashed mukherjee beans or black beans. Where can you learn more? Go to http://margo-kandi.info/.   Enter O406 in the search box to learn more about \"DASH Diet: Care Instructions. \"  Current as of: April 3, 2017  Content Version: 11.3  © 3167-6770 [employer]. Care instructions adapted under license by [employer] (which disclaims liability or warranty for this information). If you have questions about a medical condition or this instruction, always ask your healthcare professional. [address] any warranty or liability for your use of this information. Check BP twice weekly. Notify me if it consistently is above 140/90 or below 90/60. Bring your blood pressure log to your next office visit. Decrease salt, fats, caffeine, alcohol in your diet. Increase water, fiber. Exercise 5 times weekly for 30 minutes daily as tolerated. Continue current medications, care and subspecialty follow up. Visit eye doctor yearly to check your eyes blood pressure related changes. SALT    1 tsp salt = 2300 mg sodium    The average daily intake of sodium in 3year old and older = 3436 mg    77% sodium intake comes from eating PROCESSED FOODS. AVOID THE   \"SALTY SEVEN\"   to improve Blood Pressure and Swelling in the body. 1.  BREADS AND ROLLS   2. COLD CUTS AND CURED MEATS   3. PIZZA   4. POULTRY   5. SOUP   6. SANDWICHES   7. SAUCES:  HOT SAUCE AND SOY SAUCE               Learning About High Blood Sugar  What is high blood sugar? Your body turns the food you eat into glucose (sugar), which it uses for energy. But if your body isn't able to use the sugar right away, it can build up in your blood and lead to high blood sugar. When the amount of sugar in your blood stays too high for too much of the time, you may have diabetes. Diabetes is a disease that can cause serious health problems. The good news is that lifestyle changes may help you get your blood sugar back to normal and avoid or delay diabetes. What causes high blood sugar?   Sugar (glucose) can build up in your blood if you:  · Are overweight. · Have a family history of diabetes. · Take certain medicines, such as steroids. What are the symptoms? Having high blood sugar may not cause any symptoms at all. Or it may make you feel very thirsty or very hungry. You may also urinate more often than usual, have blurry vision, or lose weight without trying. How is high blood sugar treated? You can take steps to lower your blood sugar level if you understand what makes it get higher. Your doctor may want you to learn how to test your blood sugar level at home. Then you can see how illness, stress, or different kinds of food or medicine raise or lower your blood sugar level. Other tests may be needed to see if you have diabetes. How can you prevent high blood sugar? · Watch your weight. If you're overweight, losing just a small amount of weight may help. Reducing fat around your waist is most important. · Limit the amount of calories, sweets, and unhealthy fat you eat. Ask your doctor if a dietitian can help you. A registered dietitian can help you create meal plans that fit your lifestyle. · Get at least 30 minutes of exercise on most days of the week. Exercise helps control your blood sugar. It also helps you maintain a healthy weight. Walking is a good choice. You also may want to do other activities, such as running, swimming, cycling, or playing tennis or team sports. · If your doctor prescribed medicines, take them exactly as prescribed. Call your doctor if you think you are having a problem with your medicine. You will get more details on the specific medicines your doctor prescribes. Follow-up care is a key part of your treatment and safety. Be sure to make and go to all appointments, and call your doctor if you are having problems. It's also a good idea to know your test results and keep a list of the medicines you take. Where can you learn more? Go to http://margo-kandi.info/.   Enter O108 in the search box to learn more about \"Learning About High Blood Sugar. \"  Current as of: March 13, 2017  Content Version: 11.3  © 8744-4041 [email or web address]. Care instructions adapted under license by [employer] (which disclaims liability or warranty for this information). If you have questions about a medical condition or this instruction, always ask your healthcare professional. [address] any warranty or liability for your use of this information. Learning About High Blood Sugar  What is high blood sugar? Your body turns the food you eat into glucose (sugar), which it uses for energy. But if your body isn't able to use the sugar right away, it can build up in your blood and lead to high blood sugar. When the amount of sugar in your blood stays too high for too much of the time, you may have diabetes. Diabetes is a disease that can cause serious health problems. The good news is that lifestyle changes may help you get your blood sugar back to normal and avoid or delay diabetes. What causes high blood sugar? Sugar (glucose) can build up in your blood if you:  · Are overweight. · Have a family history of diabetes. · Take certain medicines, such as steroids. What are the symptoms? Having high blood sugar may not cause any symptoms at all. Or it may make you feel very thirsty or very hungry. You may also urinate more often than usual, have blurry vision, or lose weight without trying. How is high blood sugar treated? You can take steps to lower your blood sugar level if you understand what makes it get higher. Your doctor may want you to learn how to test your blood sugar level at home. Then you can see how illness, stress, or different kinds of food or medicine raise or lower your blood sugar level. Other tests may be needed to see if you have diabetes. How can you prevent high blood sugar? · Watch your weight.  If you're overweight, losing just a small amount of weight may help. Reducing fat around your waist is most important. · Limit the amount of calories, sweets, and unhealthy fat you eat. Ask your doctor if a dietitian can help you. A registered dietitian can help you create meal plans that fit your lifestyle. · Get at least 30 minutes of exercise on most days of the week. Exercise helps control your blood sugar. It also helps you maintain a healthy weight. Walking is a good choice. You also may want to do other activities, such as running, swimming, cycling, or playing tennis or team sports. · If your doctor prescribed medicines, take them exactly as prescribed. Call your doctor if you think you are having a problem with your medicine. You will get more details on the specific medicines your doctor prescribes. Follow-up care is a key part of your treatment and safety. Be sure to make and go to all appointments, and call your doctor if you are having problems. It's also a good idea to know your test results and keep a list of the medicines you take. Where can you learn more? Go to http://margo-kandi.info/. Enter O108 in the search box to learn more about \"Learning About High Blood Sugar. \"  Current as of: March 13, 2017  Content Version: 11.3  © 0723-6371 "[employer]. Care instructions adapted under license by [employer] (which disclaims liability or warranty for this information). If you have questions about a medical condition or this instruction, always ask your healthcare professional. [address] any warranty or liability for your use of this information.

## 2017-09-29 NOTE — MR AVS SNAPSHOT
Visit Information Date & Time Provider Department Dept. Phone Encounter #  
 9/29/2017 11:30 AM Aldo Benito DO [facility] [phone] [id number] Follow-up Instructions Return in about 3 months (around 12/29/2017) for bp check . Upcoming Health Maintenance Date Due ZOSTER VACCINE AGE 60> 1/19/2018* DTaP/Tdap/Td series (1 - Tdap) 1/25/2018* Hepatitis C Screening 1/26/2018* PAP AKA CERVICAL CYTOLOGY 1/26/2018* BREAST CANCER SCRN MAMMOGRAM 8/24/2019 COLONOSCOPY 4/1/2021 *Topic was postponed. The date shown is not the original due date. Allergies as of 9/29/2017  Review Complete On: 9/29/2017 By: Abner Toledo Severity Noted Reaction Type Reactions Percocet [Oxycodone-acetaminophen]  01/15/2010    Nausea and Vomiting  
 dizziness Prednisone  01/15/2010    Nausea and Vomiting \"withdrawals\" and insomnia Azithromycin Low 03/17/2017   Not Verified Nausea and Vomiting Current Immunizations  Reviewed on 9/29/2017 Name Date H1N1 FLU VACCINE 11/1/2009 Influenza Vaccine 1/5/2016, 10/13/2015 Influenza Vaccine Split 11/1/2011, 11/1/2010 Influenza Vaccine Whole 9/1/2009 Pneumococcal Polysaccharide (PPSV-23) 3/17/2017 TD Vaccine 8/1/2009 Reviewed by Aldo Benito DO on 9/29/2017 at 12:56 PM  
You Were Diagnosed With   
  
 Codes Comments Pure hypercholesterolemia    -  Primary ICD-10-CM: E78.00 ICD-9-CM: 272.0 unchanged Essential hypertension     ICD-10-CM: I10 
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 
ICD-9-CM: V73.89 Vitals BP Pulse Temp Resp Height(growth percentile) Weight(growth percentile) (!) 151/91 (BP 1 Location: Left arm, BP Patient Position: Sitting) 75 98.6 °F (37 °C) (Oral) 16 5' 3.5\" (1.613 m) 143 lb 1.6 oz (64.9 kg) SpO2 BMI OB Status Smoking Status 97% 24.95 kg/m2 Postmenopausal Former Smoker Vitals History BMI and BSA Data Body Mass Index Body Surface Area 24.95 kg/m 2 1.71 m 2 Preferred Pharmacy Pharmacy Name Phone [facility] #[id number] Calles, [address] [place] [address] [phone] Your Updated Medication List  
  
   
This list is accurate as of: 9/29/17  1:09 PM.  Always use your most recent med list.  
  
  
  
  
 aspirin delayed-release 81 mg tablet Take 1 Tab by mouth daily. Indications: prevention of transient ischemic attack  
  
 calcium 500 mg Tab Take  by mouth. Unsure of exact dose. CLARITIN 10 mg tablet Generic drug:  loratadine Take 10 mg by mouth. dilTIAZem 360 mg ER capsule Commonly known as:  [facility] Take 1 Cap by mouth daily. Indications: hypertension, heart palpitations  
  
 multivitamin tablet Commonly known as:  ONE A DAY Take 1 Tab by mouth daily. NASACORT AQ 55 mcg nasal inhaler Generic drug:  triamcinolone 2 Sprays daily. varicella zoster vacine live 19,400 unit/0.65 mL Susr injection Commonly known as:  ZOSTAVAX  
1 Vial by SubCUTAneous route once for 1 dose. Indications: PREVENTION OF HERPES ZOSTER Prescriptions Printed  Refills  
 varicella zoster vacine live (ZOSTAVAX) 19,400 unit/0.65 mL susr injection 0  
 Si Vial by SubCUTAneous route once for 1 dose. Indications: PREVENTION OF HERPES ZOSTER Class: Print Route: SubCUTAneous Prescriptions Sent to Pharmacy Refills  
 dilTIAZem (TIAZAC) 360 mg ER capsule 3 Sig: Take 1 Cap by mouth daily. Indications: hypertension, heart palpitations Class: Normal  
 Pharmacy: [facility]/pharmacy #[id number] #: [phone] Route: Oral  
  
We Performed the Following CBC WITH AUTOMATED DIFF [38482 CPT(R)] HEPATITIS C AB [38094 CPT(R)] MICROALBUMIN, UR, RAND W/ MICROALBUMIN/CREA RATIO [id number] CPT(R)] URINALYSIS W/ RFLX MICROSCOPIC [62931 CPT(R)] VITAMIN D, 25 HYDROXY [id number] CPT(R)] Follow-up Instructions Return in about 3 months (around 2017) for bp check . Patient Instructions DASH Diet: Care Instructions Your Care Instructions The DASH diet is an eating plan that can help lower your blood pressure. DASH stands for Dietary Approaches to Stop Hypertension. Hypertension is high blood pressure. The DASH diet focuses on eating foods that are high in calcium, potassium, and magnesium. These nutrients can lower blood pressure. The foods that are highest in these nutrients are fruits, vegetables, low-fat dairy products, nuts, seeds, and legumes. But taking calcium, potassium, and magnesium supplements instead of eating foods that are high in those nutrients does not have the same effect. The DASH diet also includes whole grains, fish, and poultry. The DASH diet is one of several lifestyle changes your doctor may recommend to lower your high blood pressure. Your doctor may also want you to decrease the amount of sodium in your diet. Lowering sodium while following the DASH diet can lower blood pressure even further than just the DASH diet alone. Follow-up care is a key part of your treatment and safety.  Be sure to make and go to all appointments, and call your doctor if you are having problems. It's also a good idea to know your test results and keep a list of the medicines you take. How can you care for yourself at home? Following the DASH diet · Eat 4 to 5 servings of fruit each day. A serving is 1 medium-sized piece of fruit, ½ cup chopped or canned fruit, 1/4 cup dried fruit, or 4 ounces (½ cup) of fruit juice. Choose fruit more often than fruit juice. · Eat 4 to 5 servings of vegetables each day. A serving is 1 cup of lettuce or raw leafy vegetables, ½ cup of chopped or cooked vegetables, or 4 ounces (½ cup) of vegetable juice. Choose vegetables more often than vegetable juice. · Get 2 to 3 servings of low-fat and fat-free dairy each day. A serving is 8 ounces of milk, 1 cup of yogurt, or 1 ½ ounces of cheese. · Eat 6 to 8 servings of grains each day. A serving is 1 slice of bread, 1 ounce of dry cereal, or ½ cup of cooked rice, pasta, or cooked cereal. Try to choose whole-grain products as much as possible. · Limit lean meat, poultry, and fish to 2 servings each day. A serving is 3 ounces, about the size of a deck of cards. · Eat 4 to 5 servings of nuts, seeds, and legumes (cooked dried beans, lentils, and split peas) each week. A serving is 1/3 cup of nuts, 2 tablespoons of seeds, or ½ cup of cooked beans or peas. · Limit fats and oils to 2 to 3 servings each day. A serving is 1 teaspoon of vegetable oil or 2 tablespoons of salad dressing. · Limit sweets and added sugars to 5 servings or less a week. A serving is 1 tablespoon jelly or jam, ½ cup sorbet, or 1 cup of lemonade. · Eat less than 2,300 milligrams (mg) of sodium a day. If you limit your sodium to 1,500 mg a day, you can lower your blood pressure even more. Tips for success · Start small. Do not try to make dramatic changes to your diet all at once.  You might feel that you are missing out on your favorite foods and then be more likely to not follow the plan. Make small changes, and stick with them. Once those changes become habit, add a few more changes. · Try some of the following: ¨ Make it a goal to eat a fruit or vegetable at every meal and at snacks. This will make it easy to get the recommended amount of fruits and vegetables each day. ¨ Try yogurt topped with fruit and nuts for a snack or healthy dessert. ¨ Add lettuce, tomato, cucumber, and onion to sandwiches. ¨ Combine a ready-made pizza crust with low-fat mozzarella cheese and lots of vegetable toppings. Try using tomatoes, squash, spinach, broccoli, carrots, cauliflower, and onions. ¨ Have a variety of cut-up vegetables with a low-fat dip as an appetizer instead of chips and dip. ¨ Sprinkle sunflower seeds or chopped almonds over salads. Or try adding chopped walnuts or almonds to cooked vegetables. ¨ Try some vegetarian meals using beans and peas. Add garbanzo or kidney beans to salads. Make burritos and tacos with mashed mukherjee beans or black beans. Where can you learn more? Go to http://margo-kandi.info/. Enter T196 in the search box to learn more about \"DASH Diet: Care Instructions. \" Current as of: April 3, 2017 Content Version: 11.3 © 2006-2852 [employer]. Care instructions adapted under license by [employer] (which disclaims liability or warranty for this information). If you have questions about a medical condition or this instruction, always ask your healthcare professional. [place] [address] any warranty or liability for your use of this information. Check BP twice weekly. Notify me if it consistently is above 140/90 or below 90/60. Bring your blood pressure log to your next office visit. Decrease salt, fats, caffeine, alcohol in your diet. Increase water, fiber. Exercise 5 times weekly for 30 minutes daily as tolerated. Continue current medications, care and subspecialty follow up. Visit eye doctor yearly to check your eyes blood pressure related changes. SALT 
 
1 tsp salt = 2300 mg sodium The average daily intake of sodium in 3year old and older = 3436 mg 
 
77% sodium intake comes from eating PROCESSED FOODS. AVOID THE \"SALTY SEVEN\"  
to improve Blood Pressure and Swelling in the body. 1.  BREADS AND ROLLS 2. COLD CUTS AND CURED MEATS 3. PIZZA 4. POULTRY 5. SOUP 6. SANDWICHES 7. SAUCES:  HOT SAUCE AND SOY SAUCE Learning About High Blood Sugar What is high blood sugar? Your body turns the food you eat into glucose (sugar), which it uses for energy. But if your body isn't able to use the sugar right away, it can build up in your blood and lead to high blood sugar. When the amount of sugar in your blood stays too high for too much of the time, you may have diabetes. Diabetes is a disease that can cause serious health problems. The good news is that lifestyle changes may help you get your blood sugar back to normal and avoid or delay diabetes. What causes high blood sugar? Sugar (glucose) can build up in your blood if you: · Are overweight. · Have a family history of diabetes. · Take certain medicines, such as steroids. What are the symptoms? Having high blood sugar may not cause any symptoms at all. Or it may make you feel very thirsty or very hungry. You may also urinate more often than usual, have blurry vision, or lose weight without trying. How is high blood sugar treated? You can take steps to lower your blood sugar level if you understand what makes it get higher. Your doctor may want you to learn how to test your blood sugar level at home. Then you can see how illness, stress, or different kinds of food or medicine raise or lower your blood sugar level. Other tests may be needed to see if you have diabetes. How can you prevent high blood sugar? · Watch your weight. If you're overweight, losing just a small amount of weight may help. Reducing fat around your waist is most important. · Limit the amount of calories, sweets, and unhealthy fat you eat. Ask your doctor if a dietitian can help you. A registered dietitian can help you create meal plans that fit your lifestyle. · Get at least 30 minutes of exercise on most days of the week. Exercise helps control your blood sugar. It also helps you maintain a healthy weight. Walking is a good choice. You also may want to do other activities, such as running, swimming, cycling, or playing tennis or team sports. · If your doctor prescribed medicines, take them exactly as prescribed. Call your doctor if you think you are having a problem with your medicine. You will get more details on the specific medicines your doctor prescribes. Follow-up care is a key part of your treatment and safety. Be sure to make and go to all appointments, and call your doctor if you are having problems. It's also a good idea to know your test results and keep a list of the medicines you take. Where can you learn more? Go to http://margo-kandi.info/. Enter O108 in the search box to learn more about \"Learning About High Blood Sugar. \" Current as of: March 13, 2017 Content Version: 11.3 © 6078-4571 [employer]. Care instructions adapted under license by [facility] (which disclaims liability or warranty for this information). If you have questions about a medical condition or this instruction, always ask your healthcare professional. [place] [address] any warranty or liability for your use of this information. Learning About High Blood Sugar What is high blood sugar? Your body turns the food you eat into glucose (sugar), which it uses for energy. But if your body isn't able to use the sugar right away, it can build up in your blood and lead to high blood sugar. When the amount of sugar in your blood stays too high for too much of the time, you may have diabetes. Diabetes is a disease that can cause serious health problems. The good news is that lifestyle changes may help you get your blood sugar back to normal and avoid or delay diabetes. What causes high blood sugar? Sugar (glucose) can build up in your blood if you: · Are overweight. · Have a family history of diabetes. · Take certain medicines, such as steroids. What are the symptoms? Having high blood sugar may not cause any symptoms at all. Or it may make you feel very thirsty or very hungry. You may also urinate more often than usual, have blurry vision, or lose weight without trying. How is high blood sugar treated? You can take steps to lower your blood sugar level if you understand what makes it get higher. Your doctor may want you to learn how to test your blood sugar level at home. Then you can see how illness, stress, or different kinds of food or medicine raise or lower your blood sugar level. Other tests may be needed to see if you have diabetes. How can you prevent high blood sugar? · Watch your weight. If you're overweight, losing just a small amount of weight may help. Reducing fat around your waist is most important. · Limit the amount of calories, sweets, and unhealthy fat you eat. Ask your doctor if a dietitian can help you. A registered dietitian can help you create meal plans that fit your lifestyle. · Get at least 30 minutes of exercise on most days of the week. Exercise helps control your blood sugar. It also helps you maintain a healthy weight. Walking is a good choice. You also may want to do other activities, such as running, swimming, cycling, or playing tennis or team sports. · If your doctor prescribed medicines, take them exactly as prescribed. Call your doctor if you think you are having a problem with your medicine. You will get more details on the specific medicines your doctor prescribes. Follow-up care is a key part of your treatment and safety. Be sure to make and go to all appointments, and call your doctor if you are having problems. It's also a good idea to know your test results and keep a list of the medicines you take. Where can you learn more? Go to http://margo-kandi.info/. Enter O108 in the search box to learn more about \"Learning About High Blood Sugar. \" Current as of: March 13, 2017 Content Version: 11.3 © 0081-1414 [email or web address]. Care instructions adapted under license by [employer] (which disclaims liability or warranty for this information). If you have questions about a medical condition or this instruction, always ask your healthcare professional. [address] any warranty or liability for your use of this information. Introducing [facility]! Dear Dalia Arana: 
Thank you for requesting a [employer] account. Our records indicate that you already have an active TheStreet account. You can access your account anytime at [email or web address] Did you know that you can access your hospital and ER discharge instructions at any time in TheStreet? You can also review all of your test results from your hospital stay or ER visit. Additional Information If you have questions, please visit the Frequently Asked Questions section of the TheStreet website at [email or web address] Remember, TheStreet is NOT to be used for urgent needs. For medical emergencies, dial 911. Now available from your iPhone and Android! Please provide this summary of care documentation to your next provider. Your primary care clinician is listed as Katerina Brar. If you have any questions after today's visit, please call [phone].

## 2017-09-29 NOTE — PROGRESS NOTES
Chief Complaint   Patient presents with    Blood Pressure Check    Results     1. Have you been to the ER, urgent care clinic since your last visit? Hospitalized since your last visit? No    2. Have you seen or consulted any other health care providers outside of the [facility] [place] since your last visit? Include any pap smears or colon screening. Yes, pt has seen Dr. Jeramie Rubio and Dr. Amparo Santos    In the event something were to happen to you and you were unable to speak on your behalf, do you have an Advance Directive/ Living Will in place stating your wishes? NO    If yes, do we have a copy on file NO    If no, would you like information:   Pt offered and declined.

## 2018-01-09 ENCOUNTER — OFFICE VISIT (OUTPATIENT)
Dept: FAMILY MEDICINE CLINIC | Age: 63
End: 2018-01-09

## 2018-01-09 VITALS
RESPIRATION RATE: 16 BRPM | WEIGHT: 139.6 LBS | HEIGHT: 64 IN | BODY MASS INDEX: 23.83 KG/M2 | TEMPERATURE: 98.5 F | OXYGEN SATURATION: 98 % | DIASTOLIC BLOOD PRESSURE: 83 MMHG | HEART RATE: 84 BPM | SYSTOLIC BLOOD PRESSURE: 144 MMHG

## 2018-01-09 DIAGNOSIS — R00.2 HEART PALPITATIONS: ICD-10-CM

## 2018-01-09 DIAGNOSIS — Z85.3 HISTORY OF LEFT BREAST CANCER: ICD-10-CM

## 2018-01-09 DIAGNOSIS — E78.00 PURE HYPERCHOLESTEROLEMIA: ICD-10-CM

## 2018-01-09 DIAGNOSIS — R73.9 HYPERGLYCEMIA: ICD-10-CM

## 2018-01-09 DIAGNOSIS — I10 ESSENTIAL HYPERTENSION: Primary | ICD-10-CM

## 2018-01-09 DIAGNOSIS — M81.0 AGE-RELATED OSTEOPOROSIS WITHOUT CURRENT PATHOLOGICAL FRACTURE: ICD-10-CM

## 2018-01-09 NOTE — PROGRESS NOTES
Daisy Winter is a 60 y.o. female  Chief Complaint   Patient presents with    Hypertension     Follow up     1. Have you been to the ER, urgent care clinic since your last visit? Hospitalized since your last visit? Yes When: Sunday 1/7/2018 [facility] ER for elevated BP    2. Have you seen or consulted any other health care providers outside of the [address] [place] [address] since your last visit? Include any pap smears or colon screening.  No

## 2018-01-09 NOTE — PATIENT INSTRUCTIONS
DASH Diet: Care Instructions  Your Care Instructions    The DASH diet is an eating plan that can help lower your blood pressure. DASH stands for Dietary Approaches to Stop Hypertension. Hypertension is high blood pressure. The DASH diet focuses on eating foods that are high in calcium, potassium, and magnesium. These nutrients can lower blood pressure. The foods that are highest in these nutrients are fruits, vegetables, low-fat dairy products, nuts, seeds, and legumes. But taking calcium, potassium, and magnesium supplements instead of eating foods that are high in those nutrients does not have the same effect. The DASH diet also includes whole grains, fish, and poultry. The DASH diet is one of several lifestyle changes your doctor may recommend to lower your high blood pressure. Your doctor may also want you to decrease the amount of sodium in your diet. Lowering sodium while following the DASH diet can lower blood pressure even further than just the DASH diet alone. Follow-up care is a key part of your treatment and safety. Be sure to make and go to all appointments, and call your doctor if you are having problems. It's also a good idea to know your test results and keep a list of the medicines you take. How can you care for yourself at home? Following the DASH diet  · Eat 4 to 5 servings of fruit each day. A serving is 1 medium-sized piece of fruit, ½ cup chopped or canned fruit, 1/4 cup dried fruit, or 4 ounces (½ cup) of fruit juice. Choose fruit more often than fruit juice. · Eat 4 to 5 servings of vegetables each day. A serving is 1 cup of lettuce or raw leafy vegetables, ½ cup of chopped or cooked vegetables, or 4 ounces (½ cup) of vegetable juice. Choose vegetables more often than vegetable juice. · Get 2 to 3 servings of low-fat and fat-free dairy each day. A serving is 8 ounces of milk, 1 cup of yogurt, or 1 ½ ounces of cheese. · Eat 6 to 8 servings of grains each day.  A serving is 1 slice of bread, 1 ounce of dry cereal, or ½ cup of cooked rice, pasta, or cooked cereal. Try to choose whole-grain products as much as possible. · Limit lean meat, poultry, and fish to 2 servings each day. A serving is 3 ounces, about the size of a deck of cards. · Eat 4 to 5 servings of nuts, seeds, and legumes (cooked dried beans, lentils, and split peas) each week. A serving is 1/3 cup of nuts, 2 tablespoons of seeds, or ½ cup of cooked beans or peas. · Limit fats and oils to 2 to 3 servings each day. A serving is 1 teaspoon of vegetable oil or 2 tablespoons of salad dressing. · Limit sweets and added sugars to 5 servings or less a week. A serving is 1 tablespoon jelly or jam, ½ cup sorbet, or 1 cup of lemonade. · Eat less than 2,300 milligrams (mg) of sodium a day. If you limit your sodium to 1,500 mg a day, you can lower your blood pressure even more. Tips for success  · Start small. Do not try to make dramatic changes to your diet all at once. You might feel that you are missing out on your favorite foods and then be more likely to not follow the plan. Make small changes, and stick with them. Once those changes become habit, add a few more changes. · Try some of the following:  ¨ Make it a goal to eat a fruit or vegetable at every meal and at snacks. This will make it easy to get the recommended amount of fruits and vegetables each day. ¨ Try yogurt topped with fruit and nuts for a snack or healthy dessert. ¨ Add lettuce, tomato, cucumber, and onion to sandwiches. ¨ Combine a ready-made pizza crust with low-fat mozzarella cheese and lots of vegetable toppings. Try using tomatoes, squash, spinach, broccoli, carrots, cauliflower, and onions. ¨ Have a variety of cut-up vegetables with a low-fat dip as an appetizer instead of chips and dip. ¨ Sprinkle sunflower seeds or chopped almonds over salads. Or try adding chopped walnuts or almonds to cooked vegetables.   ¨ Try some vegetarian meals using beans and peas. Add garbanzo or kidney beans to salads. Make burritos and tacos with mashed mukherjee beans or black beans. Where can you learn more? Go to http://margo-kandi.info/. Enter A067 in the search box to learn more about \"DASH Diet: Care Instructions. \"  Current as of: September 21, 2016  Content Version: 11.4  © 5631-9402 [employer]. Care instructions adapted under license by [employer] (which disclaims liability or warranty for this information). If you have questions about a medical condition or this instruction, always ask your healthcare professional. [address] any warranty or liability for your use of this information. Heart-Healthy Diet: Care Instructions  Your Care Instructions    A heart-healthy diet has lots of vegetables, fruits, nuts, beans, and whole grains, and is low in salt. It limits foods that are high in saturated fat, such as meats, cheeses, and fried foods. It may be hard to change your diet, but even small changes can lower your risk of heart attack and heart disease. Follow-up care is a key part of your treatment and safety. Be sure to make and go to all appointments, and call your doctor if you are having problems. It's also a good idea to know your test results and keep a list of the medicines you take. How can you care for yourself at home? Watch your portions  · Learn what a serving is. A \"serving\" and a \"portion\" are not always the same thing. Make sure that you are not eating larger portions than are recommended. For example, a serving of pasta is ½ cup. A serving size of meat is 2 to 3 ounces. A 3-ounce serving is about the size of a deck of cards. Measure serving sizes until you are good at Lee" them. Keep in mind that restaurants often serve portions that are 2 or 3 times the size of one serving.   · To keep your energy level up and keep you from feeling hungry, eat often but in smaller portions. · Eat only the number of calories you need to stay at a healthy weight. If you need to lose weight, eat fewer calories than your body burns (through exercise and other physical activity). Eat more fruits and vegetables  · Eat a variety of fruit and vegetables every day. Dark green, deep orange, red, or yellow fruits and vegetables are especially good for you. Examples include spinach, carrots, peaches, and berries. · Keep carrots, celery, and other veggies handy for snacks. Buy fruit that is in season and store it where you can see it so that you will be tempted to eat it. · Cook dishes that have a lot of veggies in them, such as stir-fries and soups. Limit saturated and trans fat  · Read food labels, and try to avoid saturated and trans fats. They increase your risk of heart disease. Trans fat is found in many processed foods such as cookies and crackers. · Use olive or canola oil when you cook. Try cholesterol-lowering spreads, such as Benecol or Take Control. · Bake, broil, grill, or steam foods instead of frying them. · Choose lean meats instead of high-fat meats such as hot dogs and sausages. Cut off all visible fat when you prepare meat. · Eat fish, skinless poultry, and meat alternatives such as soy products instead of high-fat meats. Soy products, such as tofu, may be especially good for your heart. · Choose low-fat or fat-free milk and dairy products. Eat fish  · Eat at least two servings of fish a week. Certain fish, such as salmon and tuna, contain omega-3 fatty acids, which may help reduce your risk of heart attack. Eat foods high in fiber  · Eat a variety of grain products every day. Include whole-grain foods that have lots of fiber and nutrients. Examples of whole-grain foods include oats, whole wheat bread, and brown rice. · Buy whole-grain breads and cereals, instead of white bread or pastries.   Limit salt and sodium  · Limit how much salt and sodium you eat to help lower your blood pressure. · Taste food before you salt it. Add only a little salt when you think you need it. With time, your taste buds will adjust to less salt. · Eat fewer snack items, fast foods, and other high-salt, processed foods. Check food labels for the amount of sodium in packaged foods. · Choose low-sodium versions of canned goods (such as soups, vegetables, and beans). Limit sugar  · Limit drinks and foods with added sugar. These include candy, desserts, and soda pop. Limit alcohol  · Limit alcohol to no more than 2 drinks a day for men and 1 drink a day for women. Too much alcohol can cause health problems. When should you call for help? Watch closely for changes in your health, and be sure to contact your doctor if:  ? · You would like help planning heart-healthy meals. Where can you learn more? Go to http://margo-kandi.info/. Enter V137 in the search box to learn more about \"Heart-Healthy Diet: Care Instructions. \"  Current as of: September 21, 2016  Content Version: 11.4  © 2537-0580 [employer]. Care instructions adapted under license by [employer] (which disclaims liability or warranty for this information). If you have questions about a medical condition or this instruction, always ask your healthcare professional. [facility] any warranty or liability for your use of this information.

## 2018-01-09 NOTE — MR AVS SNAPSHOT
Visit Information Date & Time Provider Department Dept. Phone Encounter #  
 1/9/2018  2:30 PM [phone] [id number] Follow-up Instructions Return in about 3 months (around 4/9/2018) for blood pressure, weight. Upcoming Health Maintenance Date Due ZOSTER VACCINE AGE 60> 1/19/2018* DTaP/Tdap/Td series (1 - Tdap) 1/25/2018* Hepatitis C Screening 1/26/2018* BREAST CANCER SCRN MAMMOGRAM 8/24/2019 PAP AKA CERVICAL CYTOLOGY 7/3/2020 COLONOSCOPY 4/1/2021 *Topic was postponed. The date shown is not the original due date. Allergies as of 1/9/2018  Review Complete On: 1/9/2018 By: Gagandeep Gill DO Severity Noted Reaction Type Reactions Percocet [Oxycodone-acetaminophen]  01/15/2010    Nausea and Vomiting  
 dizziness Prednisone  01/15/2010    Nausea and Vomiting \"withdrawals\" and insomnia Azithromycin Low 03/17/2017   Not Verified Nausea and Vomiting Current Immunizations  Reviewed on 1/9/2018 Name Date H1N1 FLU VACCINE 11/1/2009 Influenza Vaccine 1/5/2016, 10/13/2015 Influenza Vaccine (Quad) PF 9/29/2017 Influenza Vaccine Split 11/1/2011, 11/1/2010 Influenza Vaccine Whole 9/1/2009 Pneumococcal Polysaccharide (PPSV-23) 3/17/2017 TD Vaccine 8/1/2009 Reviewed by Gagandeep Gill DO on 1/9/2018 at  3:18 PM  
 Reviewed by Gagandeep Gill DO on 1/9/2018 at  3:19 PM  
You Were Diagnosed With   
  
 Codes Comments Essential hypertension    -  Primary ICD-10-CM: I10 
ICD-9-CM: 401.9 slightly improved with increased Cardizem from 300 mg to 360 mg daily Pure hypercholesterolemia     ICD-10-CM: E78.00 ICD-9-CM: 272.0 stable Heart palpitations     ICD-10-CM: R00.2 ICD-9-CM: 785.1 resolved Hyperglycemia     ICD-10-CM: R73.9 ICD-9-CM: 790.29 unchanged since 2013 History of left breast cancer     ICD-10-CM: Z85.3 ICD-9-CM: V10.3 Age-related osteoporosis without current pathological fracture     ICD-10-CM: M81.0 ICD-9-CM: 733.01 Vitals BP Pulse Temp Resp Height(growth percentile) Weight(growth percentile) 144/83 (BP 1 Location: Left arm, BP Patient Position: Sitting) 84 98.5 °F (36.9 °C) (Oral) 16 5' 3.5\" (1.613 m) 139 lb 9.6 oz (63.3 kg) SpO2 BMI OB Status Smoking Status 98% 24.34 kg/m2 Postmenopausal Former Smoker Vitals History BMI and BSA Data Body Mass Index Body Surface Area  
 24.34 kg/m 2 1.68 m 2 Preferred Pharmacy Pharmacy Name Phone [id number], [address] [phone] Your Updated Medication List  
  
   
This list is accurate as of: 1/9/18  3:20 PM.  Always use your most recent med list.  
  
  
  
  
 aspirin delayed-release 81 mg tablet Take 1 Tab by mouth daily. Indications: prevention of transient ischemic attack  
  
 calcium 500 mg Tab Take  by mouth. Unsure of exact dose. CLARITIN 10 mg tablet Generic drug:  loratadine Take 10 mg by mouth. dilTIAZem 360 mg ER capsule Commonly known as:  [facility] Take 1 Cap by mouth daily. Indications: hypertension, heart palpitations  
  
 multivitamin tablet Commonly known as:  ONE A DAY Take 1 Tab by mouth daily. NASACORT AQ 55 mcg nasal inhaler Generic drug:  triamcinolone 2 Sprays daily. Follow-up Instructions Return in about 3 months (around 4/9/2018) for blood pressure, weight. Patient Instructions DASH Diet: Care Instructions Your Care Instructions The DASH diet is an eating plan that can help lower your blood pressure. DASH stands for Dietary Approaches to Stop Hypertension. Hypertension is high blood pressure. The DASH diet focuses on eating foods that are high in calcium, potassium, and magnesium. These nutrients can lower blood pressure.  The foods that are highest in these nutrients are fruits, vegetables, low-fat dairy products, nuts, seeds, and legumes. But taking calcium, potassium, and magnesium supplements instead of eating foods that are high in those nutrients does not have the same effect. The DASH diet also includes whole grains, fish, and poultry. The DASH diet is one of several lifestyle changes your doctor may recommend to lower your high blood pressure. Your doctor may also want you to decrease the amount of sodium in your diet. Lowering sodium while following the DASH diet can lower blood pressure even further than just the DASH diet alone. Follow-up care is a key part of your treatment and safety. Be sure to make and go to all appointments, and call your doctor if you are having problems. It's also a good idea to know your test results and keep a list of the medicines you take. How can you care for yourself at home? Following the DASH diet · Eat 4 to 5 servings of fruit each day. A serving is 1 medium-sized piece of fruit, ½ cup chopped or canned fruit, 1/4 cup dried fruit, or 4 ounces (½ cup) of fruit juice. Choose fruit more often than fruit juice. · Eat 4 to 5 servings of vegetables each day. A serving is 1 cup of lettuce or raw leafy vegetables, ½ cup of chopped or cooked vegetables, or 4 ounces (½ cup) of vegetable juice. Choose vegetables more often than vegetable juice. · Get 2 to 3 servings of low-fat and fat-free dairy each day. A serving is 8 ounces of milk, 1 cup of yogurt, or 1 ½ ounces of cheese. · Eat 6 to 8 servings of grains each day. A serving is 1 slice of bread, 1 ounce of dry cereal, or ½ cup of cooked rice, pasta, or cooked cereal. Try to choose whole-grain products as much as possible. · Limit lean meat, poultry, and fish to 2 servings each day. A serving is 3 ounces, about the size of a deck of cards.  
· Eat 4 to 5 servings of nuts, seeds, and legumes (cooked dried beans, lentils, and split peas) each week. A serving is 1/3 cup of nuts, 2 tablespoons of seeds, or ½ cup of cooked beans or peas. · Limit fats and oils to 2 to 3 servings each day. A serving is 1 teaspoon of vegetable oil or 2 tablespoons of salad dressing. · Limit sweets and added sugars to 5 servings or less a week. A serving is 1 tablespoon jelly or jam, ½ cup sorbet, or 1 cup of lemonade. · Eat less than 2,300 milligrams (mg) of sodium a day. If you limit your sodium to 1,500 mg a day, you can lower your blood pressure even more. Tips for success · Start small. Do not try to make dramatic changes to your diet all at once. You might feel that you are missing out on your favorite foods and then be more likely to not follow the plan. Make small changes, and stick with them. Once those changes become habit, add a few more changes. · Try some of the following: ¨ Make it a goal to eat a fruit or vegetable at every meal and at snacks. This will make it easy to get the recommended amount of fruits and vegetables each day. ¨ Try yogurt topped with fruit and nuts for a snack or healthy dessert. ¨ Add lettuce, tomato, cucumber, and onion to sandwiches. ¨ Combine a ready-made pizza crust with low-fat mozzarella cheese and lots of vegetable toppings. Try using tomatoes, squash, spinach, broccoli, carrots, cauliflower, and onions. ¨ Have a variety of cut-up vegetables with a low-fat dip as an appetizer instead of chips and dip. ¨ Sprinkle sunflower seeds or chopped almonds over salads. Or try adding chopped walnuts or almonds to cooked vegetables. ¨ Try some vegetarian meals using beans and peas. Add garbanzo or kidney beans to salads. Make burritos and tacos with mashed mukherjee beans or black beans. Where can you learn more? Go to http://margo-kandi.info/. Enter S425 in the search box to learn more about \"DASH Diet: Care Instructions. \" Current as of: September 21, 2016 Content Version: 11.4 © 7762-8449 [employer]. Care instructions adapted under license by [employer] (which disclaims liability or warranty for this information). If you have questions about a medical condition or this instruction, always ask your healthcare professional. [address] any warranty or liability for your use of this information. Heart-Healthy Diet: Care Instructions Your Care Instructions A heart-healthy diet has lots of vegetables, fruits, nuts, beans, and whole grains, and is low in salt. It limits foods that are high in saturated fat, such as meats, cheeses, and fried foods. It may be hard to change your diet, but even small changes can lower your risk of heart attack and heart disease. Follow-up care is a key part of your treatment and safety. Be sure to make and go to all appointments, and call your doctor if you are having problems. It's also a good idea to know your test results and keep a list of the medicines you take. How can you care for yourself at home? Watch your portions · Learn what a serving is. A \"serving\" and a \"portion\" are not always the same thing. Make sure that you are not eating larger portions than are recommended. For example, a serving of pasta is ½ cup. A serving size of meat is 2 to 3 ounces. A 3-ounce serving is about the size of a deck of cards. Measure serving sizes until you are good at [place]" them. Keep in mind that restaurants often serve portions that are 2 or 3 times the size of one serving. · To keep your energy level up and keep you from feeling hungry, eat often but in smaller portions. · Eat only the number of calories you need to stay at a healthy weight. If you need to lose weight, eat fewer calories than your body burns (through exercise and other physical activity). Eat more fruits and vegetables · Eat a variety of fruit and vegetables every day.  Dark green, deep orange, red, or yellow fruits and vegetables are especially good for you. Examples include spinach, carrots, peaches, and berries. · Keep carrots, celery, and other veggies handy for snacks. Buy fruit that is in season and store it where you can see it so that you will be tempted to eat it. · Cook dishes that have a lot of veggies in them, such as stir-fries and soups. Limit saturated and trans fat · Read food labels, and try to avoid saturated and trans fats. They increase your risk of heart disease. Trans fat is found in many processed foods such as cookies and crackers. · Use olive or canola oil when you cook. Try cholesterol-lowering spreads, such as Benecol or Take Control. · Bake, broil, grill, or steam foods instead of frying them. · Choose lean meats instead of high-fat meats such as hot dogs and sausages. Cut off all visible fat when you prepare meat. · Eat fish, skinless poultry, and meat alternatives such as soy products instead of high-fat meats. Soy products, such as tofu, may be especially good for your heart. · Choose low-fat or fat-free milk and dairy products. Eat fish · Eat at least two servings of fish a week. Certain fish, such as salmon and tuna, contain omega-3 fatty acids, which may help reduce your risk of heart attack. Eat foods high in fiber · Eat a variety of grain products every day. Include whole-grain foods that have lots of fiber and nutrients. Examples of whole-grain foods include oats, whole wheat bread, and brown rice. · Buy whole-grain breads and cereals, instead of white bread or pastries. Limit salt and sodium · Limit how much salt and sodium you eat to help lower your blood pressure. · Taste food before you salt it. Add only a little salt when you think you need it. With time, your taste buds will adjust to less salt. · Eat fewer snack items, fast foods, and other high-salt, processed foods. Check food labels for the amount of sodium in packaged foods. · Choose low-sodium versions of canned goods (such as soups, vegetables, and beans). Limit sugar · Limit drinks and foods with added sugar. These include candy, desserts, and soda pop. Limit alcohol · Limit alcohol to no more than 2 drinks a day for men and 1 drink a day for women. Too much alcohol can cause health problems. When should you call for help? Watch closely for changes in your health, and be sure to contact your doctor if: 
? · You would like help planning heart-healthy meals. Where can you learn more? Go to http://margo-kandi.info/. Enter V137 in the search box to learn more about \"Heart-Healthy Diet: Care Instructions. \" Current as of: September 21, 2016 Content Version: 11.4 © 3727-8774 [employer]. Care instructions adapted under license by [employer] (which disclaims liability or warranty for this information). If you have questions about a medical condition or this instruction, always ask your healthcare professional. [address] any warranty or liability for your use of this information. Introducing [facility]! Dear Aleksandr Dennison: 
Thank you for requesting a [employer] [facility] account. Our records indicate that you already have an active [employer] [facility] account. You can access your account anytime at [email or web address] Did you know that you can access your hospital and ER discharge instructions at any time in [facility]? You can also review all of your test results from your hospital stay or ER visit. Additional Information If you have questions, please visit the Frequently Asked Questions section of the [employer] [facility] website at [email or web address] Remember, [employer] [facility] is NOT to be used for urgent needs. For medical emergencies, dial 911. Now available from your iPhone and Android! Please provide this summary of care documentation to your next provider. Your primary care clinician is listed as [facility]. If you have any questions after today's visit, please call [phone].

## 2018-01-09 NOTE — ACP (ADVANCE CARE PLANNING)
Re-discussed ACP with patient. Gave her another Right to [facility]. She prefers to discuss it with her . Declines referral to [facility] team at this time. Patient will bring document to her next office visit.

## 2018-01-09 NOTE — PROGRESS NOTES
HISTORY OF PRESENT ILLNESS  Daisy Winter is a 60 y.o. female presents with Hypertension (Follow up) and ED Follow-up [facility] )    Agree with nurse note. Hypertensive, hyperglycemic pt with hypercholesterolemia and hx of of L breast ca presents to the office with a BP of 144/83. At her last visit in 09/2017, we increased Diltiazem from 300 mg to 360 mg. BPs have been elevated still at home when she checks. Patient denies vision changes, headaches, dizziness, chest pain, SOB, or swelling. She went to [facility] ER on 01/07/18 for elevated BP and heart racing. BP was 167/94 and HR of 115 bpm upon arrival but dropped to 134/71 and HR of 79 bpm within an hour. Dx'd palpations. She brought us a copy of her lab results and EKG. Na 146. Troponin, CBC, Lipase, and TSH WNL. EKG NSR with possible anterior infarct. Per pt, she was not treated with meds nor were any meds changed. Feeling better now. She weighs 139 lbs, down 4 lbs since 09/2017. She reports losing more weight prior to Christmas and gaining some back due to cookies. She walks 2 miles daily. Health Maintenance    She saw GYN, Dr. Alex Trujillo on 06/29/17 for a routine gyn exam. Pap performed. Negative hemoccult performed. F/U 2 year. Mammogram was 08/24/17; normal.     DEXA scan in 2013 showed osteoporosis. She has an order for a new DEXA scan ordered by Dr. Saloni Garcia. Previously tx'd by Dr. Debbie Jaquez. Pt's most recent colonoscopy was on 06/24/10 with GI, Dr. Joss Aldrich. F/U 10 years. She had an eye exam in 06/2017 with optometrist, Dr. Brielle Orosco. F/U 1 year. Written by kathryn Quinteros, as dictated by Dr. Drea De La Garza DO.    MARIAM    Review of Systems negative except as noted above in HPI.     ALLERGIES:    Allergies   Allergen Reactions    Percocet [Oxycodone-Acetaminophen] Nausea and Vomiting     dizziness    Prednisone Nausea and Vomiting     \"withdrawals\" and insomnia    Azithromycin Nausea and Vomiting       CURRENT MEDICATIONS:    Outpatient Prescriptions Marked as Taking for the 1/9/18 encounter (Office Visit) with Eve Nestor, DO   Medication Sig Dispense Refill    dilTIAZem (TIAZAC) 360 mg ER capsule Take 1 Cap by mouth daily. Indications: hypertension, heart palpitations 90 Cap 3    loratadine (CLARITIN) 10 mg tablet Take 10 mg by mouth.  triamcinolone (NASACORT AQ) 55 mcg nasal inhaler 2 Sprays daily.  calcium 500 mg tab Take  by mouth. Unsure of exact dose.  multivitamin (ONE A DAY) tablet Take 1 Tab by mouth daily. PAST MEDICAL HISTORY:    Past Medical History:   Diagnosis Date    Breast cancer, stage 1 ([facility] [address] 2003    Ductal Carcinoma In Situ. Radiation and surgery. Dr. Tejas Ruiz. Dr. Sotero Paris.  Chest pain 07/2007    Negative Cardiolite Stress Test.  Dr. Blaise Romero Essential hypertension, benign 07/26/07    Dr. Blaise Romero Fracture of foot 4/30/15    5th metatarsal fracture left foot. Dr. Yuliet Chirinos. [employer].  Heart palpitations 07/2007    Dr. Bautista Sea disease     Dr. Rylee Carpenter Metatarsal fracture 12/2006    Right 5th.  Osteoporosis 08/20/08    Dr. Sonam Aguila S/P radiation therapy 2003    Stress fracture of lower leg 06/11/08    Right distal stress reaction. PAST SURGICAL HISTORY:    Past Surgical History:   Procedure Laterality Date    HX BREAST LUMPECTOMY  2003    left due to cancer with radiation    HX COLONOSCOPY  06/24/2010    Dr. Stephon Patricio. due q 10 yrs.     HX POLYPECTOMY  04/2008    uterine  Dr. Jose R Rey  childhood    Alexey Mtz       FAMILY HISTORY:    Family History   Problem Relation Age of Onset    Heart Attack Mother 72    Heart Disease Mother      CAD    High Cholesterol Mother     Other Mother      advanced Meniere's Disease    Cancer Mother 80     LUNG    Diabetes Father     High Cholesterol Sister     Seizures Sister    [facility] Other Sister      MACULAR DEGENERATION    Breast Cancer Paternal Grandmother      pt was in her [de-identified]       SOCIAL HISTORY:    Social History     Social History    Marital status:      Spouse name: N/A    Number of children: N/A    Years of education: N/A     Social History Main Topics    Smoking status: Former Smoker     Types: Cigarettes     Quit date: 1/1/1972    Smokeless tobacco: Never Used      Comment: smoked x6 mos at 21 y/o    Alcohol use No    Drug use: No    Sexual activity: Yes     Partners: Male     Birth control/ protection: Surgical      Comment: TL     Other Topics Concern    None     Social History Narrative       IMMUNIZATIONS:    Immunization History   Administered Date(s) Administered    H1N1 Influenza Virus Vaccine 11/01/2009    Influenza Vaccine 10/13/2015, 01/05/2016    Influenza Vaccine (Quad) PF 09/29/2017    Influenza Vaccine Split 11/01/2010, 11/01/2011    Influenza Vaccine Whole 09/01/2009    Pneumococcal Polysaccharide (PPSV-23) 03/17/2017    TD Vaccine 08/01/2009         PHYSICAL EXAMINATION    Vital Signs    Visit Vitals    /83 (BP 1 Location: Left arm, BP Patient Position: Sitting)    Pulse 84    Temp 98.5 °F (36.9 °C) (Oral)    Resp 16    Ht 5' 3.5\" (1.613 m)    Wt 139 lb 9.6 oz (63.3 kg)    SpO2 98%    BMI 24.34 kg/m2       Weight Metrics 1/9/2018 9/29/2017 3/17/2017 3/2/2016 1/2/2015 8/1/2014 2/19/2014   Weight 139 lb 9.6 oz 143 lb 1.6 oz 140 lb 14.4 oz 141 lb 140 lb 3.2 oz 131 lb 11.2 oz 132 lb 6.4 oz   BMI 24.34 kg/m2 24.95 kg/m2 24.56 kg/m2 24.58 kg/m2 24.44 kg/m2 22.96 kg/m2 23.46 kg/m2       General appearance - Well nourished. Well appearing. Well developed. No acute distress. Overweight. Head - Normocephalic. Atraumatic. Eyes - pupils equal and reactive. Extraocular eye movements intact. Sclera anicteric. Mildly injected sclera. Ears - Hearing is grossly normal bilaterally. Nose - normal and patent. No polyps noted.   No erythema. No discharge. Mouth - mucous membranes with adequate moisture. Posterior pharynx normal with cobblestone appearance. No erythema, white exudate or obstruction. Neck - supple. Midline trachea. No carotid bruits noted bilaterally. No thyromegaly noted. Chest - clear to auscultation bilaterally anteriorly and posteriorly. No wheezes. No rales or rhonchi. Breath sounds are symmetrical bilaterally. Unlabored respirations. Heart - normal rate. Regular rhythm. Normal S1, S2. No murmur noted. No rubs, clicks or gallops noted. Abdomen - soft and distended. No masses or organomegaly. No rebound, rigidity or guarding. Bowel sounds normal x 4 quadrants. No tenderness noted. Neurological - awake, alert and oriented to person, place, and time and event. Cranial nerves II through XII intact. Clear speech. Muscle strength is +5/5 x 4 extremities. Sensation is intact to light touch bilaterally. Steady gait. Heme/Lymph - peripheral pulses normal x 4 extremities. No peripheral edema is noted. Musculoskeletal - Intact x 4 extremities. Full ROM x 4 extremities. No pain with movement. Back exam - normal range of motion. No pain on palpation of the spinous processes in the cervical, thoracic, lumbar, sacral regions. No CVA tenderness. Skin - no rashes, erythema, ecchymosis, lacerations, abrasions, suspicious moles noted  Psychological -   normal behavior, dress and thought processes. Good insight. Good eye contact. Normal affect. Appropriate mood. Normal speech.       DATA REVIEWED    Results for orders placed or performed in visit on 11/15/17   AMB EXT OCCULT BLOOD, STOOL   Result Value Ref Range    Occult Blood, External negative      Lab Results   Component Value Date/Time    WBC 5.1 07/29/2014 09:33 AM    HGB 12.5 07/29/2014 09:33 AM    HCT 38.6 07/29/2014 09:33 AM    PLATELET 563 36/52/2230 09:33 AM    MCV 89 07/29/2014 09:33 AM     Lab Results   Component Value Date/Time Sodium 143 09/25/2017 08:21 AM    Potassium 4.3 09/25/2017 08:21 AM    Chloride 103 09/25/2017 08:21 AM    CO2 27 09/25/2017 08:21 AM    Glucose 86 09/25/2017 08:21 AM    BUN 14 09/25/2017 08:21 AM    Creatinine 0.74 09/25/2017 08:21 AM    BUN/Creatinine ratio 19 09/25/2017 08:21 AM    GFR est  09/25/2017 08:21 AM    GFR est non-AA 86 09/25/2017 08:21 AM    Calcium 9.3 09/25/2017 08:21 AM    Bilirubin, total 0.3 09/25/2017 08:21 AM    AST (SGOT) 17 09/25/2017 08:21 AM    Alk. phosphatase 84 09/25/2017 08:21 AM    Protein, total 6.5 09/25/2017 08:21 AM    Albumin 4.3 09/25/2017 08:21 AM    A-G Ratio 2.0 09/25/2017 08:21 AM    ALT (SGPT) 14 09/25/2017 08:21 AM     Lab Results   Component Value Date/Time    Cholesterol, total 222 09/25/2017 08:21 AM    HDL Cholesterol 89 09/25/2017 08:21 AM    LDL, calculated 116 09/25/2017 08:21 AM    VLDL, calculated 17 09/25/2017 08:21 AM    Triglyceride 87 09/25/2017 08:21 AM     Lab Results   Component Value Date/Time    Vitamin D 25-Hydroxy 37.1 06/20/2011 08:19 AM    VITAMIN D, 25-HYDROXY 42.7 07/29/2014 09:33 AM       Lab Results   Component Value Date/Time    Hemoglobin A1c 5.7 09/25/2017 08:21 AM     Lab Results   Component Value Date/Time    TSH 1.700 09/25/2017 08:21 AM       ASSESSMENT and PLAN      ICD-10-CM ICD-9-CM    1. Essential hypertension I10 401.9     slightly improved with increased Cardizem from 300 mg to 360 mg daily   2. Pure hypercholesterolemia E78.00 272.0     stable   3. Heart palpitations R00.2 785.1     resolved   4. Hyperglycemia R73.9 790.29     unchanged since 2013   5. History of left breast cancer Z85.3 V10.3    6. Age-related osteoporosis without current pathological fracture M81.0 733.01        Discussed the patient's BMI with her. The BMI follow up plan is as follows: Pt not eligible for BMI calculation due to normal weight.   Decrease carbohydrates (white foods, sweet foods, sweet drinks and alcohol), increase green leafy vegetables and protein (lean meats and beans) with each meal.  Avoid fried foods. Eat 3-5 small meals daily. Do not skip meals. Increase water intake. Reduce salt intake. Increase physical activity to 30 minutes daily for health benefit or 60 minutes daily to prevent weight regain, as tolerated. Get 7-8 hours uninterrupted sleep nightly. Chart reviewed and updated. Continue current medications and care. Continue Diltiazem 360 mg daily. Reduce salt, increase water intake, and continue exercising. Most recent tests reviewed. Recent office visit notes from [facility], Dr. Liz Montalvo and Dr. Al San Ardo reviewed. Get recent office visit notes from [facility]. Advised pt to sign release. Counseled patient on health concerns:  BP and hyperglycemia. Relevant handouts given and discussed with patient. Immunizations noted. Offered empathy, support, legitimation, prayers, partnership to patient. Praised patient for progress. Reminded pt to complete ACP and bring us a copy to be scanned into EMR. Follow-up Disposition:  Return in about 3 months (around 4/9/2018) for blood pressure, weight. Patient was offered a choice/choices in the treatment plan today. Patient expresses understanding of the plan and agrees with recommendations. Written by kathryn Melendez, as dictated by Dr. Jaquelin Mei DO. Documentation True and Accepted by [facility] [place]. David Deras. Patient Instructions          DASH Diet: Care Instructions  Your Care Instructions    The DASH diet is an eating plan that can help lower your blood pressure. DASH stands for Dietary Approaches to Stop Hypertension. Hypertension is high blood pressure. The DASH diet focuses on eating foods that are high in calcium, potassium, and magnesium. These nutrients can lower blood pressure. The foods that are highest in these nutrients are fruits, vegetables, low-fat dairy products, nuts, seeds, and legumes.  But taking calcium, potassium, and magnesium supplements instead of eating foods that are high in those nutrients does not have the same effect. The DASH diet also includes whole grains, fish, and poultry. The DASH diet is one of several lifestyle changes your doctor may recommend to lower your high blood pressure. Your doctor may also want you to decrease the amount of sodium in your diet. Lowering sodium while following the DASH diet can lower blood pressure even further than just the DASH diet alone. Follow-up care is a key part of your treatment and safety. Be sure to make and go to all appointments, and call your doctor if you are having problems. It's also a good idea to know your test results and keep a list of the medicines you take. How can you care for yourself at home? Following the DASH diet  · Eat 4 to 5 servings of fruit each day. A serving is 1 medium-sized piece of fruit, ½ cup chopped or canned fruit, 1/4 cup dried fruit, or 4 ounces (½ cup) of fruit juice. Choose fruit more often than fruit juice. · Eat 4 to 5 servings of vegetables each day. A serving is 1 cup of lettuce or raw leafy vegetables, ½ cup of chopped or cooked vegetables, or 4 ounces (½ cup) of vegetable juice. Choose vegetables more often than vegetable juice. · Get 2 to 3 servings of low-fat and fat-free dairy each day. A serving is 8 ounces of milk, 1 cup of yogurt, or 1 ½ ounces of cheese. · Eat 6 to 8 servings of grains each day. A serving is 1 slice of bread, 1 ounce of dry cereal, or ½ cup of cooked rice, pasta, or cooked cereal. Try to choose whole-grain products as much as possible. · Limit lean meat, poultry, and fish to 2 servings each day. A serving is 3 ounces, about the size of a deck of cards. · Eat 4 to 5 servings of nuts, seeds, and legumes (cooked dried beans, lentils, and split peas) each week. A serving is 1/3 cup of nuts, 2 tablespoons of seeds, or ½ cup of cooked beans or peas. · Limit fats and oils to 2 to 3 servings each day.  A serving is 1 teaspoon of vegetable oil or 2 tablespoons of salad dressing. · Limit sweets and added sugars to 5 servings or less a week. A serving is 1 tablespoon jelly or jam, ½ cup sorbet, or 1 cup of lemonade. · Eat less than 2,300 milligrams (mg) of sodium a day. If you limit your sodium to 1,500 mg a day, you can lower your blood pressure even more. Tips for success  · Start small. Do not try to make dramatic changes to your diet all at once. You might feel that you are missing out on your favorite foods and then be more likely to not follow the plan. Make small changes, and stick with them. Once those changes become habit, add a few more changes. · Try some of the following:  ¨ Make it a goal to eat a fruit or vegetable at every meal and at snacks. This will make it easy to get the recommended amount of fruits and vegetables each day. ¨ Try yogurt topped with fruit and nuts for a snack or healthy dessert. ¨ Add lettuce, tomato, cucumber, and onion to sandwiches. ¨ Combine a ready-made pizza crust with low-fat mozzarella cheese and lots of vegetable toppings. Try using tomatoes, squash, spinach, broccoli, carrots, cauliflower, and onions. ¨ Have a variety of cut-up vegetables with a low-fat dip as an appetizer instead of chips and dip. ¨ Sprinkle sunflower seeds or chopped almonds over salads. Or try adding chopped walnuts or almonds to cooked vegetables. ¨ Try some vegetarian meals using beans and peas. Add garbanzo or kidney beans to salads. Make burritos and tacos with mashed mukherjee beans or black beans. Where can you learn more? Go to http://margo-kandi.info/. Enter U651 in the search box to learn more about \"DASH Diet: Care Instructions. \"  Current as of: September 21, 2016  Content Version: 11.4  © 1176-8422 [employer]. Care instructions adapted under license by [employer] (which disclaims liability or warranty for this information).  If you have questions about a medical condition or this instruction, always ask your healthcare professional. [address] any warranty or liability for your use of this information. Heart-Healthy Diet: Care Instructions  Your Care Instructions    A heart-healthy diet has lots of vegetables, fruits, nuts, beans, and whole grains, and is low in salt. It limits foods that are high in saturated fat, such as meats, cheeses, and fried foods. It may be hard to change your diet, but even small changes can lower your risk of heart attack and heart disease. Follow-up care is a key part of your treatment and safety. Be sure to make and go to all appointments, and call your doctor if you are having problems. It's also a good idea to know your test results and keep a list of the medicines you take. How can you care for yourself at home? Watch your portions  · Learn what a serving is. A \"serving\" and a \"portion\" are not always the same thing. Make sure that you are not eating larger portions than are recommended. For example, a serving of pasta is ½ cup. A serving size of meat is 2 to 3 ounces. A 3-ounce serving is about the size of a deck of cards. Measure serving sizes until you are good at [place]" them. Keep in mind that restaurants often serve portions that are 2 or 3 times the size of one serving. · To keep your energy level up and keep you from feeling hungry, eat often but in smaller portions. · Eat only the number of calories you need to stay at a healthy weight. If you need to lose weight, eat fewer calories than your body burns (through exercise and other physical activity). Eat more fruits and vegetables  · Eat a variety of fruit and vegetables every day. Dark green, deep orange, red, or yellow fruits and vegetables are especially good for you. Examples include spinach, carrots, peaches, and berries. · Keep carrots, celery, and other veggies handy for snacks.  Buy fruit that is in season and store it where you can see it so that you will be tempted to eat it. · Cook dishes that have a lot of veggies in them, such as stir-fries and soups. Limit saturated and trans fat  · Read food labels, and try to avoid saturated and trans fats. They increase your risk of heart disease. Trans fat is found in many processed foods such as cookies and crackers. · Use olive or canola oil when you cook. Try cholesterol-lowering spreads, such as Benecol or Take Control. · Bake, broil, grill, or steam foods instead of frying them. · Choose lean meats instead of high-fat meats such as hot dogs and sausages. Cut off all visible fat when you prepare meat. · Eat fish, skinless poultry, and meat alternatives such as soy products instead of high-fat meats. Soy products, such as tofu, may be especially good for your heart. · Choose low-fat or fat-free milk and dairy products. Eat fish  · Eat at least two servings of fish a week. Certain fish, such as salmon and tuna, contain omega-3 fatty acids, which may help reduce your risk of heart attack. Eat foods high in fiber  · Eat a variety of grain products every day. Include whole-grain foods that have lots of fiber and nutrients. Examples of whole-grain foods include oats, whole wheat bread, and brown rice. · Buy whole-grain breads and cereals, instead of white bread or pastries. Limit salt and sodium  · Limit how much salt and sodium you eat to help lower your blood pressure. · Taste food before you salt it. Add only a little salt when you think you need it. With time, your taste buds will adjust to less salt. · Eat fewer snack items, fast foods, and other high-salt, processed foods. Check food labels for the amount of sodium in packaged foods. · Choose low-sodium versions of canned goods (such as soups, vegetables, and beans). Limit sugar  · Limit drinks and foods with added sugar. These include candy, desserts, and soda pop.   Limit alcohol  · Limit alcohol to no more than 2 drinks a day for men and 1 drink a day for women. Too much alcohol can cause health problems. When should you call for help? Watch closely for changes in your health, and be sure to contact your doctor if:  ? · You would like help planning heart-healthy meals. Where can you learn more? Go to http://margo-kandi.info/. Enter V137 in the search box to learn more about \"Heart-Healthy Diet: Care Instructions. \"  Current as of: September 21, 2016  Content Version: 11.4  © 8057-1759 [employer]. Care instructions adapted under license by [employer] (which disclaims liability or warranty for this information). If you have questions about a medical condition or this instruction, always ask your healthcare professional. [address] any warranty or liability for your use of this information.

## 2018-04-09 ENCOUNTER — OFFICE VISIT (OUTPATIENT)
Dept: FAMILY MEDICINE CLINIC | Age: 63
End: 2018-04-09

## 2018-04-09 VITALS
WEIGHT: 137.9 LBS | SYSTOLIC BLOOD PRESSURE: 132 MMHG | DIASTOLIC BLOOD PRESSURE: 72 MMHG | BODY MASS INDEX: 23.54 KG/M2 | HEART RATE: 89 BPM | TEMPERATURE: 98.6 F | RESPIRATION RATE: 16 BRPM | OXYGEN SATURATION: 98 % | HEIGHT: 64 IN

## 2018-04-09 DIAGNOSIS — R23.3 EASY BRUISABILITY: ICD-10-CM

## 2018-04-09 DIAGNOSIS — R63.4 WEIGHT LOSS: ICD-10-CM

## 2018-04-09 DIAGNOSIS — R00.2 HEART PALPITATIONS: ICD-10-CM

## 2018-04-09 DIAGNOSIS — E78.00 PURE HYPERCHOLESTEROLEMIA: ICD-10-CM

## 2018-04-09 DIAGNOSIS — R73.9 HYPERGLYCEMIA: ICD-10-CM

## 2018-04-09 DIAGNOSIS — I10 ESSENTIAL HYPERTENSION: Primary | ICD-10-CM

## 2018-04-09 DIAGNOSIS — Z11.59 NEED FOR HEPATITIS C SCREENING TEST: ICD-10-CM

## 2018-04-09 NOTE — PROGRESS NOTES
HISTORY OF PRESENT ILLNESS  Brannon Leach is a 60 y.o. female presents with Hypertension and Labs    Agree with nurse note. Hypertensive, hyperglycemic pt with hypercholesterolemia and hx of heart palpitations presents to the office with a BP of 155/97. She notes her BP is always higher at our office compared to when she checks outside of the office. She drinks 4 cups of coffee daily. She drinks at least 64 oz daily of water. For BP, she takes Diltiazem 360 mg daily, tolerating well. Patient denies vision changes, headaches, dizziness, chest pain, SOB, or swelling. Pt with easy bruising. She takes a 1/2 tab instead of a full tablet of Aspirin 81 mg daily and feels this has helped with her bruising. If she bruises now then she can identify why. She weighs 137 lbs, down 2 lbs since 01/2018 and down 6 lbs since 09/2017. She has decreased her intake of sweets. She walks every other day and rides a stationary bike daily. Health Maintenance    Pt's most recent colonoscopy was on 06/24/10 with GI, Dr. Airam Rodriguez. F/U 10 years. Pt's most recent mammogram was on 08/24/17; normal.     Written by kathryn Rolle, as dictated by Dr. Karen Brink DO.    ROS    Review of Systems negative except as noted above in HPI. ALLERGIES:    Allergies   Allergen Reactions    Percocet [Oxycodone-Acetaminophen] Nausea and Vomiting     dizziness    Prednisone Nausea and Vomiting     \"withdrawals\" and insomnia    Azithromycin Nausea and Vomiting       CURRENT MEDICATIONS:    Outpatient Prescriptions Marked as Taking for the 4/9/18 encounter (Office Visit) with Cuong Chow DO   Medication Sig Dispense Refill    dilTIAZem (TIAZAC) 360 mg ER capsule Take 1 Cap by mouth daily. Indications: hypertension, heart palpitations 90 Cap 3    loratadine (CLARITIN) 10 mg tablet Take 10 mg by mouth.  aspirin delayed-release 81 mg tablet Take 1 Tab by mouth daily.  Indications: prevention of transient ischemic attack (Patient taking differently: Take 39.5 mg by mouth daily. Indications: prevention of transient ischemic attack) 90 Tab 3    triamcinolone (NASACORT AQ) 55 mcg nasal inhaler 2 Sprays daily.  calcium 500 mg tab Take  by mouth. Unsure of exact dose.  multivitamin (ONE A DAY) tablet Take 1 Tab by mouth daily. PAST MEDICAL HISTORY:    Past Medical History:   Diagnosis Date    Breast cancer, stage 1 ([facility] [address] 2003    Ductal Carcinoma In Situ. Radiation and surgery. Dr. Catherine Goodwin. Dr. Dewaine Merlin.  Chest pain 07/2007    Negative Cardiolite Stress Test.  Dr. Joana Tobar Essential hypertension, benign 07/26/07    Dr. Joana Tobar Fracture of foot 4/30/15    5th metatarsal fracture left foot. Dr. Blair Howard. [employer]. 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 Martin Blevins Metatarsal fracture 12/2006    Right 5th.  Osteoporosis 08/20/08    Dr. Dede Thakur S/P radiation therapy 2003    Stress fracture of lower leg 06/11/08    Right distal stress reaction. PAST SURGICAL HISTORY:    Past Surgical History:   Procedure Laterality Date    HX BREAST LUMPECTOMY  2003    left due to cancer with radiation    HX COLONOSCOPY  06/24/2010    Dr. Alvin Hutchinson. due q 10 yrs.     HX POLYPECTOMY  04/2008    uterine  Dr. Sarah Navarrete  childhood    Blaine Deven    Dr. Delon Rahman       FAMILY HISTORY:    Family History   Problem Relation Age of Onset    Heart Attack Mother 72    Heart Disease Mother      CAD    High Cholesterol Mother     Other Mother      advanced Meniere's Disease    Cancer Mother 80     LUNG    Diabetes Father     High Cholesterol Sister     Seizures Sister     Other Sister      MACULAR DEGENERATION    Breast Cancer Paternal Grandmother      pt was in her [de-identified]       SOCIAL HISTORY:    Social History     Social History    Marital status:      Spouse name: N/A   Angely Gorman Number of children: N/A    Years of education: N/A     Social History Main Topics    Smoking status: Former Smoker     Types: Cigarettes     Quit date: 1/1/1972    Smokeless tobacco: Never Used      Comment: smoked x6 mos at 21 y/o    Alcohol use No    Drug use: No    Sexual activity: Yes     Partners: Male     Birth control/ protection: Surgical      Comment: TL     Other Topics Concern    None     Social History Narrative       IMMUNIZATIONS:    Immunization History   Administered Date(s) Administered    H1N1 Influenza Virus Vaccine 11/01/2009    Influenza Vaccine 10/13/2015, 01/05/2016    Influenza Vaccine (Quad) PF 09/29/2017    Influenza Vaccine Split 11/01/2010, 11/01/2011    Influenza Vaccine Whole 09/01/2009    Pneumococcal Polysaccharide (PPSV-23) 03/17/2017    TD Vaccine 08/01/2009         PHYSICAL EXAMINATION    Vital Signs    Visit Vitals    BP (!) 155/97 (BP 1 Location: Left arm, BP Patient Position: Sitting)    Pulse 89    Temp 98.6 °F (37 °C) (Oral)    Resp 16    Ht 5' 3.5\" (1.613 m)    Wt 137 lb 14.4 oz (62.6 kg)    SpO2 98%    BMI 24.04 kg/m2       Weight Metrics 4/9/2018 1/9/2018 9/29/2017 3/17/2017 3/2/2016 1/2/2015 8/1/2014   Weight 137 lb 14.4 oz 139 lb 9.6 oz 143 lb 1.6 oz 140 lb 14.4 oz 141 lb 140 lb 3.2 oz 131 lb 11.2 oz   BMI 24.04 kg/m2 24.34 kg/m2 24.95 kg/m2 24.56 kg/m2 24.58 kg/m2 24.44 kg/m2 22.96 kg/m2       General appearance - Well nourished. Well appearing. Well developed. No acute distress. Head - Normocephalic. Atraumatic. Eyes - pupils equal and reactive. Extraocular eye movements intact. Sclera anicteric. Mildly injected sclera. Ears - Hearing is grossly normal bilaterally. Nose - normal and patent. No polyps noted. No erythema. No discharge. Mouth - mucous membranes with adequate moisture. Posterior pharynx normal with cobblestone appearance. No erythema, white exudate or obstruction. Neck - supple. Midline trachea.   No carotid bruits noted bilaterally. No thyromegaly noted. Chest - clear to auscultation bilaterally anteriorly and posteriorly. No wheezes. No rales or rhonchi. Breath sounds are symmetrical bilaterally. Unlabored respirations. Heart - normal rate. Regular rhythm. Normal S1, S2. No murmur noted. No rubs, clicks or gallops noted. Abdomen - soft and nondistended. No masses or organomegaly. No rebound, rigidity or guarding. Bowel sounds normal x 4 quadrants. No tenderness noted. Neurological - awake, alert and oriented to person, place, and time and event. Cranial nerves II through XII intact. Clear speech. Muscle strength is +5/5 x 4 extremities. Sensation is intact to light touch bilaterally. Steady gait. Heme/Lymph - peripheral pulses normal x 4 extremities. No peripheral edema is noted. Psychological -   normal behavior, dress and thought processes. Good insight. Good eye contact. Normal affect. Appropriate mood. Normal speech. DATA REVIEWED    Lab Results   Component Value Date/Time    WBC 5.1 07/29/2014 09:33 AM    HGB 12.5 07/29/2014 09:33 AM    HCT 38.6 07/29/2014 09:33 AM    PLATELET 422 33/73/1264 09:33 AM    MCV 89 07/29/2014 09:33 AM     Lab Results   Component Value Date/Time    Sodium 143 09/25/2017 08:21 AM    Potassium 4.3 09/25/2017 08:21 AM    Chloride 103 09/25/2017 08:21 AM    CO2 27 09/25/2017 08:21 AM    Glucose 86 09/25/2017 08:21 AM    BUN 14 09/25/2017 08:21 AM    Creatinine 0.74 09/25/2017 08:21 AM    BUN/Creatinine ratio 19 09/25/2017 08:21 AM    GFR est  09/25/2017 08:21 AM    GFR est non-AA 86 09/25/2017 08:21 AM    Calcium 9.3 09/25/2017 08:21 AM    Bilirubin, total 0.3 09/25/2017 08:21 AM    AST (SGOT) 17 09/25/2017 08:21 AM    Alk.  phosphatase 84 09/25/2017 08:21 AM    Protein, total 6.5 09/25/2017 08:21 AM    Albumin 4.3 09/25/2017 08:21 AM    A-G Ratio 2.0 09/25/2017 08:21 AM    ALT (SGPT) 14 09/25/2017 08:21 AM     Lab Results   Component Value Date/Time    Cholesterol, total 222 (H) 09/25/2017 08:21 AM    HDL Cholesterol 89 09/25/2017 08:21 AM    LDL, calculated 116 (H) 09/25/2017 08:21 AM    VLDL, calculated 17 09/25/2017 08:21 AM    Triglyceride 87 09/25/2017 08:21 AM     Lab Results   Component Value Date/Time    Vitamin D 25-Hydroxy 37.1 06/20/2011 08:19 AM    VITAMIN D, 25-HYDROXY 42.7 07/29/2014 09:33 AM       Lab Results   Component Value Date/Time    Hemoglobin A1c 5.7 (H) 09/25/2017 08:21 AM     Lab Results   Component Value Date/Time    TSH 1.700 09/25/2017 08:21 AM       ASSESSMENT and PLAN      ICD-10-CM ICD-9-CM    1. Essential hypertension I10 401.9 LIPID PANEL      METABOLIC PANEL, COMPREHENSIVE      VITAMIN D, 25 HYDROXY      MICROALBUMIN, UR, RAND W/ MICROALB/CREAT RATIO      URINALYSIS W/ RFLX MICROSCOPIC    uncontrolled, due to white coat syndrome vs other   2. Hyperglycemia V06.9 313.52 METABOLIC PANEL, COMPREHENSIVE      HEMOGLOBIN A1C WITH EAG   3. Pure hypercholesterolemia E78.00 272.0 LIPID PANEL      METABOLIC PANEL, COMPREHENSIVE   4. Weight loss R63.4 783.21 CBC W/O DIFF    3# since 01/2018 due to reduced sweets   5. Easy bruisability R23.8 782.9     worse taking ASA 81 mg daily, improving with 1/2 dose   6. Heart palpitations R00.2 785.1     stable while taking coffee 4 c daily   7. Need for hepatitis C screening test Z11.59 V73.89 HEPATITIS C AB       Discussed the patient's BMI with her. The BMI follow up plan is as follows: Pt not eligible for BMI calculation due to normal weight. Decrease carbohydrates (white foods, sweet foods, sweet drinks and alcohol), increase green leafy vegetables and protein (lean meats and beans) with each meal.  Avoid fried foods. Eat 3-5 small meals daily. Do not skip meals. Increase water intake. Increase physical activity to 30 minutes daily for health benefit or 60 minutes daily to prevent weight regain, as tolerated. Get 7-8 hours uninterrupted sleep nightly.   Chart reviewed and updated. Continue current medications and care. Most recent tests reviewed. Counseled patient on health concerns:  BP and cholesterol. Relevant handouts given and discussed with patient. Immunizations noted. Advise tetanus vaccine when pt has a cut, animal bite, or burn. Offered empathy, support, legitimation, prayers, partnership to patient. Praised patient for progress. Discussed ACP today. Declines honoring choices referral.   Follow-up Disposition:  Return in about 3 months (around 7/9/2018) for bp. Patient was offered a choice/choices in the treatment plan today. Patient expresses understanding of the plan and agrees with recommendations. Written by kathryn Rolle, as dictated by Dr. Karen Brink DO. Documentation True and Accepted by Vidhya Rodriguez. Patient Instructions        DASH Diet: Care Instructions  Your Care Instructions    The DASH diet is an eating plan that can help lower your blood pressure. DASH stands for Dietary Approaches to Stop Hypertension. Hypertension is high blood pressure. The DASH diet focuses on eating foods that are high in calcium, potassium, and magnesium. These nutrients can lower blood pressure. The foods that are highest in these nutrients are fruits, vegetables, low-fat dairy products, nuts, seeds, and legumes. But taking calcium, potassium, and magnesium supplements instead of eating foods that are high in those nutrients does not have the same effect. The DASH diet also includes whole grains, fish, and poultry. The DASH diet is one of several lifestyle changes your doctor may recommend to lower your high blood pressure. Your doctor may also want you to decrease the amount of sodium in your diet. Lowering sodium while following the DASH diet can lower blood pressure even further than just the DASH diet alone. Follow-up care is a key part of your treatment and safety.  Be sure to make and go to all appointments, and call your doctor if you are having problems. It's also a good idea to know your test results and keep a list of the medicines you take. How can you care for yourself at home? Following the DASH diet  · Eat 4 to 5 servings of fruit each day. A serving is 1 medium-sized piece of fruit, ½ cup chopped or canned fruit, 1/4 cup dried fruit, or 4 ounces (½ cup) of fruit juice. Choose fruit more often than fruit juice. · Eat 4 to 5 servings of vegetables each day. A serving is 1 cup of lettuce or raw leafy vegetables, ½ cup of chopped or cooked vegetables, or 4 ounces (½ cup) of vegetable juice. Choose vegetables more often than vegetable juice. · Get 2 to 3 servings of low-fat and fat-free dairy each day. A serving is 8 ounces of milk, 1 cup of yogurt, or 1 ½ ounces of cheese. · Eat 6 to 8 servings of grains each day. A serving is 1 slice of bread, 1 ounce of dry cereal, or ½ cup of cooked rice, pasta, or cooked cereal. Try to choose whole-grain products as much as possible. · Limit lean meat, poultry, and fish to 2 servings each day. A serving is 3 ounces, about the size of a deck of cards. · Eat 4 to 5 servings of nuts, seeds, and legumes (cooked dried beans, lentils, and split peas) each week. A serving is 1/3 cup of nuts, 2 tablespoons of seeds, or ½ cup of cooked beans or peas. · Limit fats and oils to 2 to 3 servings each day. A serving is 1 teaspoon of vegetable oil or 2 tablespoons of salad dressing. · Limit sweets and added sugars to 5 servings or less a week. A serving is 1 tablespoon jelly or jam, ½ cup sorbet, or 1 cup of lemonade. · Eat less than 2,300 milligrams (mg) of sodium a day. If you limit your sodium to 1,500 mg a day, you can lower your blood pressure even more. Tips for success  · Start small. Do not try to make dramatic changes to your diet all at once. You might feel that you are missing out on your favorite foods and then be more likely to not follow the plan.  Make small changes, and stick with them. Once those changes become habit, add a few more changes. · Try some of the following:  ¨ Make it a goal to eat a fruit or vegetable at every meal and at snacks. This will make it easy to get the recommended amount of fruits and vegetables each day. ¨ Try yogurt topped with fruit and nuts for a snack or healthy dessert. ¨ Add lettuce, tomato, cucumber, and onion to sandwiches. ¨ Combine a ready-made pizza crust with low-fat mozzarella cheese and lots of vegetable toppings. Try using tomatoes, squash, spinach, broccoli, carrots, cauliflower, and onions. ¨ Have a variety of cut-up vegetables with a low-fat dip as an appetizer instead of chips and dip. ¨ Sprinkle sunflower seeds or chopped almonds over salads. Or try adding chopped walnuts or almonds to cooked vegetables. ¨ Try some vegetarian meals using beans and peas. Add garbanzo or kidney beans to salads. Make burritos and tacos with mashed mukherjee beans or black beans. Where can you learn more? Go to http://margoPI Corporationkandi.info/. Enter U176 in the search box to learn more about \"DASH Diet: Care Instructions. \"  Current as of: September 21, 2016  Content Version: 11.4  © 2753-9016 [employer]. Care instructions adapted under license by [employer] (which disclaims liability or warranty for this information). If you have questions about a medical condition or this instruction, always ask your healthcare professional. [place] [address] any warranty or liability for your use of this information. Advance Care Planning: Care Instructions  Your Care Instructions    It can be hard to live with an illness that cannot be cured. But if your health is getting worse, you may want to make decisions about end-of-life care. Planning for the end of your life does not mean that you are giving up. It is a way to make sure that your wishes are met.  Clearly stating your wishes can make it easier for your loved ones. Making plans while you are still able may also ease your mind and make your final days less stressful and more meaningful. Follow-up care is a key part of your treatment and safety. Be sure to make and go to all appointments, and call your doctor if you are having problems. It's also a good idea to know your test results and keep a list of the medicines you take. What can you do to plan for the end of life? · You can bring these issues up with your doctor. You do not need to wait until your doctor starts the conversation. You might start with \"I would not be willing to live with . Larraine Piles Larraine Piles Larraine Piles \" When you complete this sentence it helps your doctor understand your wishes. · Talk openly and honestly with your doctor. This is the best way to understand the decisions you will need to make as your health changes. Know that you can always change your mind. · Ask your doctor about commonly used life-support measures. These include tube feedings, breathing machines, and fluids given through a vein (IV). Understanding these treatments will help you decide whether you want them. · You may choose to have these life-supporting treatments for a limited time. This allows a trial period to see whether they will help you. You may also decide that you want your doctor to take only certain measures to keep you alive. It is important to spell out these conditions so that your doctor and family understand them. · Talk to your doctor about how long you are likely to live. He or she may be able to give you an idea of what usually happens with your specific illness. · Think about preparing papers that state your wishes. This way there will not be any confusion about what you want. You can change your instructions at any time. Which papers should you prepare? Advance directives are legal papers that tell doctors how you want to be cared for at the end of your life. You do not need a  to write these papers.  Ask your doctor or your [facility] department for information on how to write your advance directives. They may have the forms for each of these types of papers. Make sure your doctor has a copy of these on file, and give a copy to a family member or close friend. · Consider a do-not-resuscitate order (DNR). This order asks that no extra treatments be done if your heart stops or you stop breathing. Extra treatments may include cardiopulmonary resuscitation (CPR), electrical shock to restart your heart, or a machine to breathe for you. If you decide to have a DNR order, ask your doctor to explain and write it. Place the order in your home where everyone can easily see it. · Consider a living will. A living will explains your wishes about life support and other treatments at the end of your life if you become unable to speak for yourself. Living garcia tell doctors to use or not use treatments that would keep you alive. You must have one or two witnesses or a notary present when you sign this form. · Consider a durable power of  for health care. This allows you to name a person to make decisions about your care if you are not able to. Most people ask a close friend or family member. Talk to this person about the kinds of treatments you want and those that you do not want. Make sure this person understands your wishes. These legal papers are simple to change. Tell your doctor what you want to change, and ask him or her to make a note in your medical file. Give your family updated copies of the papers. Where can you learn more? Go to http://margo-kandi.info/. Enter P184 in the search box to learn more about \"Advance Care Planning: Care Instructions. \"  Current as of: September 24, 2016  Content Version: 11.4  © 5486-5394 [employer]. Care instructions adapted under license by [employer] (which disclaims liability or warranty for this information).  If you have questions about a medical condition or this instruction, always ask your healthcare professional. [place] [address] any warranty or liability for your use of this information.

## 2018-04-09 NOTE — MR AVS SNAPSHOT
[address] 
[address] [place] [address] 
[phone] Patient: Ferdinand Francisco MRN: [id number]:[id number] Visit Information Date & Time Provider Department Dept. Phone Encounter #  
 4/9/2018 11:30 AM Aminata Goncalves DO [facility] [phone] [id number] Follow-up Instructions Return in about 3 months (around 7/9/2018) for bp. Upcoming Health Maintenance Date Due Hepatitis C Screening 8/24/2018* ZOSTER VACCINE AGE 60> 9/21/2018* DTaP/Tdap/Td series (1 - Tdap) 9/21/2018* BREAST CANCER SCRN MAMMOGRAM 8/24/2019 PAP AKA CERVICAL CYTOLOGY 7/3/2020 COLONOSCOPY 4/1/2021 *Topic was postponed. The date shown is not the original due date. Allergies as of 4/9/2018  Review Complete On: 4/9/2018 By: Shashank Fernando, Certified Medical Assistant Severity Noted Reaction Type Reactions Percocet [Oxycodone-acetaminophen]  01/15/2010    Nausea and Vomiting  
 dizziness Prednisone  01/15/2010    Nausea and Vomiting \"withdrawals\" and insomnia Azithromycin Low 03/17/2017   Not Verified Nausea and Vomiting Current Immunizations  Reviewed on 4/9/2018 Name Date H1N1 FLU VACCINE 11/1/2009 Influenza Vaccine 1/5/2016, 10/13/2015 Influenza Vaccine (Quad) PF 9/29/2017 Influenza Vaccine Split 11/1/2011, 11/1/2010 Influenza Vaccine Whole 9/1/2009 Pneumococcal Polysaccharide (PPSV-23) 3/17/2017 TD Vaccine 8/1/2009 Reviewed by Aminata Goncalves DO on 4/9/2018 at 12:18 PM  
You Were Diagnosed With   
  
 Codes Comments Essential hypertension    -  Primary ICD-10-CM: I10 
ICD-9-CM: 401.9 Hyperglycemia     ICD-10-CM: R73.9 ICD-9-CM: 790.29 Pure hypercholesterolemia     ICD-10-CM: E78.00 ICD-9-CM: 272.0 Weight loss     ICD-10-CM: R63.4 ICD-9-CM: 783.21 3# since 01/2018 due to reduced sweets Easy bruisability     ICD-10-CM: R23.8 ICD-9-CM: 782.9 worse taking ASA 81 mg daily, improving with 1/2 dose Heart palpitations     ICD-10-CM: R00.2 ICD-9-CM: 785.1 stable while taking coffee 4 c daily Need for hepatitis C screening test     ICD-10-CM: Z11.59 
ICD-9-CM: V73.89 Vitals BP Pulse Temp Resp Height(growth percentile) Weight(growth percentile) (!) 155/97 (BP 1 Location: Left arm, BP Patient Position: Sitting) 89 98.6 °F (37 °C) (Oral) 16 5' 3.5\" (1.613 m) 137 lb 14.4 oz (62.6 kg) SpO2 BMI OB Status Smoking Status 98% 24.04 kg/m2 Postmenopausal Former Smoker Vitals History BMI and BSA Data Body Mass Index Body Surface Area 24.04 kg/m 2 1.67 m 2 Preferred Pharmacy Pharmacy Name Phone [id number] [address] [phone] Your Updated Medication List  
  
   
This list is accurate as of 4/9/18 12:23 PM.  Always use your most recent med list.  
  
  
  
  
 aspirin delayed-release 81 mg tablet Take 1 Tab by mouth daily. Indications: prevention of transient ischemic attack  
  
 calcium 500 mg Tab Take  by mouth. Unsure of exact dose. CLARITIN 10 mg tablet Generic drug:  loratadine Take 10 mg by mouth. dilTIAZem 360 mg ER capsule Commonly known as:  [facility] Take 1 Cap by mouth daily. Indications: hypertension, heart palpitations  
  
 multivitamin tablet Commonly known as:  ONE A DAY Take 1 Tab by mouth daily. NASACORT AQ 55 mcg nasal inhaler Generic drug:  triamcinolone 2 Sprays daily. We Performed the Following CBC W/O DIFF [59840 CPT(R)] HEMOGLOBIN A1C WITH EAG [25713 CPT(R)] HEPATITIS C AB [91845 CPT(R)] LIPID PANEL [52189 CPT(R)] METABOLIC PANEL, COMPREHENSIVE [53862 CPT(R)] MICROALBUMIN, UR, RAND W/ MICROALB/CREAT RATIO [id number] CPT(R)] URINALYSIS W/ RFLX MICROSCOPIC [14600 CPT(R)] VITAMIN D, 25 HYDROXY [id number] CPT(R)] Follow-up Instructions Return in about 3 months (around 7/9/2018) for bp. Patient Instructions DASH Diet: Care Instructions Your Care Instructions The DASH diet is an eating plan that can help lower your blood pressure. DASH stands for Dietary Approaches to Stop Hypertension. Hypertension is high blood pressure. The DASH diet focuses on eating foods that are high in calcium, potassium, and magnesium. These nutrients can lower blood pressure. The foods that are highest in these nutrients are fruits, vegetables, low-fat dairy products, nuts, seeds, and legumes. But taking calcium, potassium, and magnesium supplements instead of eating foods that are high in those nutrients does not have the same effect. The DASH diet also includes whole grains, fish, and poultry. The DASH diet is one of several lifestyle changes your doctor may recommend to lower your high blood pressure. Your doctor may also want you to decrease the amount of sodium in your diet. Lowering sodium while following the DASH diet can lower blood pressure even further than just the DASH diet alone. Follow-up care is a key part of your treatment and safety. Be sure to make and go to all appointments, and call your doctor if you are having problems. It's also a good idea to know your test results and keep a list of the medicines you take. How can you care for yourself at home? Following the DASH diet · Eat 4 to 5 servings of fruit each day. A serving is 1 medium-sized piece of fruit, ½ cup chopped or canned fruit, 1/4 cup dried fruit, or 4 ounces (½ cup) of fruit juice. Choose fruit more often than fruit juice. · Eat 4 to 5 servings of vegetables each day. A serving is 1 cup of lettuce or raw leafy vegetables, ½ cup of chopped or cooked vegetables, or 4 ounces (½ cup) of vegetable juice. Choose vegetables more often than vegetable juice. · Get 2 to 3 servings of low-fat and fat-free dairy each day.  A serving is 8 ounces of milk, 1 cup of yogurt, or 1 ½ ounces of cheese. · Eat 6 to 8 servings of grains each day. A serving is 1 slice of bread, 1 ounce of dry cereal, or ½ cup of cooked rice, pasta, or cooked cereal. Try to choose whole-grain products as much as possible. · Limit lean meat, poultry, and fish to 2 servings each day. A serving is 3 ounces, about the size of a deck of cards. · Eat 4 to 5 servings of nuts, seeds, and legumes (cooked dried beans, lentils, and split peas) each week. A serving is 1/3 cup of nuts, 2 tablespoons of seeds, or ½ cup of cooked beans or peas. · Limit fats and oils to 2 to 3 servings each day. A serving is 1 teaspoon of vegetable oil or 2 tablespoons of salad dressing. · Limit sweets and added sugars to 5 servings or less a week. A serving is 1 tablespoon jelly or jam, ½ cup sorbet, or 1 cup of lemonade. · Eat less than 2,300 milligrams (mg) of sodium a day. If you limit your sodium to 1,500 mg a day, you can lower your blood pressure even more. Tips for success · Start small. Do not try to make dramatic changes to your diet all at once. You might feel that you are missing out on your favorite foods and then be more likely to not follow the plan. Make small changes, and stick with them. Once those changes become habit, add a few more changes. · Try some of the following: ¨ Make it a goal to eat a fruit or vegetable at every meal and at snacks. This will make it easy to get the recommended amount of fruits and vegetables each day. ¨ Try yogurt topped with fruit and nuts for a snack or healthy dessert. ¨ Add lettuce, tomato, cucumber, and onion to sandwiches. ¨ Combine a ready-made pizza crust with low-fat mozzarella cheese and lots of vegetable toppings. Try using tomatoes, squash, spinach, broccoli, carrots, cauliflower, and onions. ¨ Have a variety of cut-up vegetables with a low-fat dip as an appetizer instead of chips and dip. ¨ Sprinkle sunflower seeds or chopped almonds over salads. Or try adding chopped walnuts or almonds to cooked vegetables. ¨ Try some vegetarian meals using beans and peas. Add garbanzo or kidney beans to salads. Make burritos and tacos with mashed mukherjee beans or black beans. Where can you learn more? Go to http://margo-kandi.info/. Enter R022 in the search box to learn more about \"DASH Diet: Care Instructions. \" Current as of: September 21, 2016 Content Version: 11.4 © 3676-6101 [employer]Gen[employer]. Care instructions adapted under license by M[employer] (which disclaims liability or warranty for this information). If you have questions about a medical condition or this instruction, always ask your healthcare professional. [address] any warranty or liability for your use of this information. Advance Care Planning: Care Instructions Your Care Instructions It can be hard to live with an illness that cannot be cured. But if your health is getting worse, you may want to make decisions about end-of-life care. Planning for the end of your life does not mean that you are giving up. It is a way to make sure that your wishes are met. Clearly stating your wishes can make it easier for your loved ones. Making plans while you are still able may also ease your mind and make your final days less stressful and more meaningful. Follow-up care is a key part of your treatment and safety. Be sure to make and go to all appointments, and call your doctor if you are having problems. It's also a good idea to know your test results and keep a list of the medicines you take. What can you do to plan for the end of life? · You can bring these issues up with your doctor. You do not need to wait until your doctor starts the conversation. You might start with \"I would not be willing to live with . Bony Huston \" When you complete this sentence it helps your doctor understand your wishes. · Talk openly and honestly with your doctor. This is the best way to understand the decisions you will need to make as your health changes. Know that you can always change your mind. · Ask your doctor about commonly used life-support measures. These include tube feedings, breathing machines, and fluids given through a vein (IV). Understanding these treatments will help you decide whether you want them. · You may choose to have these life-supporting treatments for a limited time. This allows a trial period to see whether they will help you. You may also decide that you want your doctor to take only certain measures to keep you alive. It is important to spell out these conditions so that your doctor and family understand them. · Talk to your doctor about how long you are likely to live. He or she may be able to give you an idea of what usually happens with your specific illness. · Think about preparing papers that state your wishes. This way there will not be any confusion about what you want. You can change your instructions at any time. Which papers should you prepare? Advance directives are legal papers that tell doctors how you want to be cared for at the end of your life. You do not need a  to write these papers. Ask your doctor or your state health department for information on how to write your advance directives. They may have the forms for each of these types of papers. Make sure your doctor has a copy of these on file, and give a copy to a family member or close friend. · Consider a do-not-resuscitate order (DNR). This order asks that no extra treatments be done if your heart stops or you stop breathing. Extra treatments may include cardiopulmonary resuscitation (CPR), electrical shock to restart your heart, or a machine to breathe for you. If you decide to have a DNR order, ask your doctor to explain and write it.  Place the order in your home where everyone can easily see it. · Consider a living will. A living will explains your wishes about life support and other treatments at the end of your life if you become unable to speak for yourself. Living garcia tell doctors to use or not use treatments that would keep you alive. You must have one or two witnesses or a notary present when you sign this form. · Consider a durable power of  for health care. This allows you to name a person to make decisions about your care if you are not able to. Most people ask a close friend or family member. Talk to this person about the kinds of treatments you want and those that you do not want. Make sure this person understands your wishes. These legal papers are simple to change. Tell your doctor what you want to change, and ask him or her to make a note in your medical file. Give your family updated copies of the papers. Where can you learn more? Go to http://margo-kandi.info/. Enter P184 in the search box to learn more about \"Advance Care Planning: Care Instructions. \" Current as of: September 24, 2016 Content Version: 11.4 © 3058-1360 Revl. Care instructions adapted under license by [employer] (which disclaims liability or warranty for this information). If you have questions about a medical condition or this instruction, always ask your healthcare professional. [address] any warranty or liability for your use of this information. Introducing [facility]! Dear Kathrine Horta: 
Thank you for requesting a [employer] account. Our records indicate that you already have an active [employer] account. You can access your account anytime at [email or web address] Did you know that you can access your hospital and ER discharge instructions at any time in [employer]? You can also review all of your test results from your hospital stay or ER visit. Additional Information If you have questions, please visit the Frequently Asked Questions section of the [employer] website at [email or web address] Remember, [employer] is NOT to be used for urgent needs. For medical emergencies, dial 911. Now available from your iPhone and Android! Please provide this summary of care documentation to your next provider. Your primary care clinician is listed as Mary Degroot. If you have any questions after today's visit, please call [phone].

## 2018-04-09 NOTE — PATIENT INSTRUCTIONS
DASH Diet: Care Instructions  Your Care Instructions    The DASH diet is an eating plan that can help lower your blood pressure. DASH stands for Dietary Approaches to Stop Hypertension. Hypertension is high blood pressure. The DASH diet focuses on eating foods that are high in calcium, potassium, and magnesium. These nutrients can lower blood pressure. The foods that are highest in these nutrients are fruits, vegetables, low-fat dairy products, nuts, seeds, and legumes. But taking calcium, potassium, and magnesium supplements instead of eating foods that are high in those nutrients does not have the same effect. The DASH diet also includes whole grains, fish, and poultry. The DASH diet is one of several lifestyle changes your doctor may recommend to lower your high blood pressure. Your doctor may also want you to decrease the amount of sodium in your diet. Lowering sodium while following the DASH diet can lower blood pressure even further than just the DASH diet alone. Follow-up care is a key part of your treatment and safety. Be sure to make and go to all appointments, and call your doctor if you are having problems. It's also a good idea to know your test results and keep a list of the medicines you take. How can you care for yourself at home? Following the DASH diet  · Eat 4 to 5 servings of fruit each day. A serving is 1 medium-sized piece of fruit, ½ cup chopped or canned fruit, 1/4 cup dried fruit, or 4 ounces (½ cup) of fruit juice. Choose fruit more often than fruit juice. · Eat 4 to 5 servings of vegetables each day. A serving is 1 cup of lettuce or raw leafy vegetables, ½ cup of chopped or cooked vegetables, or 4 ounces (½ cup) of vegetable juice. Choose vegetables more often than vegetable juice. · Get 2 to 3 servings of low-fat and fat-free dairy each day. A serving is 8 ounces of milk, 1 cup of yogurt, or 1 ½ ounces of cheese. · Eat 6 to 8 servings of grains each day.  A serving is 1 slice of bread, 1 ounce of dry cereal, or ½ cup of cooked rice, pasta, or cooked cereal. Try to choose whole-grain products as much as possible. · Limit lean meat, poultry, and fish to 2 servings each day. A serving is 3 ounces, about the size of a deck of cards. · Eat 4 to 5 servings of nuts, seeds, and legumes (cooked dried beans, lentils, and split peas) each week. A serving is 1/3 cup of nuts, 2 tablespoons of seeds, or ½ cup of cooked beans or peas. · Limit fats and oils to 2 to 3 servings each day. A serving is 1 teaspoon of vegetable oil or 2 tablespoons of salad dressing. · Limit sweets and added sugars to 5 servings or less a week. A serving is 1 tablespoon jelly or jam, ½ cup sorbet, or 1 cup of lemonade. · Eat less than 2,300 milligrams (mg) of sodium a day. If you limit your sodium to 1,500 mg a day, you can lower your blood pressure even more. Tips for success  · Start small. Do not try to make dramatic changes to your diet all at once. You might feel that you are missing out on your favorite foods and then be more likely to not follow the plan. Make small changes, and stick with them. Once those changes become habit, add a few more changes. · Try some of the following:  ¨ Make it a goal to eat a fruit or vegetable at every meal and at snacks. This will make it easy to get the recommended amount of fruits and vegetables each day. ¨ Try yogurt topped with fruit and nuts for a snack or healthy dessert. ¨ Add lettuce, tomato, cucumber, and onion to sandwiches. ¨ Combine a ready-made pizza crust with low-fat mozzarella cheese and lots of vegetable toppings. Try using tomatoes, squash, spinach, broccoli, carrots, cauliflower, and onions. ¨ Have a variety of cut-up vegetables with a low-fat dip as an appetizer instead of chips and dip. ¨ Sprinkle sunflower seeds or chopped almonds over salads. Or try adding chopped walnuts or almonds to cooked vegetables.   ¨ Try some vegetarian meals using beans and peas. Add garbanzo or kidney beans to salads. Make burritos and tacos with mashed mukherjee beans or black beans. Where can you learn more? Go to http://margo-kandi.info/. Enter Q700 in the search box to learn more about \"DASH Diet: Care Instructions. \"  Current as of: September 21, 2016  Content Version: 11.4  © 1359-8576 [employer]allia. Care instructions adapted under license by [employer] (which disclaims liability or warranty for this information). If you have questions about a medical condition or this instruction, always ask your healthcare professional. [address] any warranty or liability for your use of this information. Advance Care Planning: Care Instructions  Your Care Instructions    It can be hard to live with an illness that cannot be cured. But if your health is getting worse, you may want to make decisions about end-of-life care. Planning for the end of your life does not mean that you are giving up. It is a way to make sure that your wishes are met. Clearly stating your wishes can make it easier for your loved ones. Making plans while you are still able may also ease your mind and make your final days less stressful and more meaningful. Follow-up care is a key part of your treatment and safety. Be sure to make and go to all appointments, and call your doctor if you are having problems. It's also a good idea to know your test results and keep a list of the medicines you take. What can you do to plan for the end of life? · You can bring these issues up with your doctor. You do not need to wait until your doctor starts the conversation. You might start with \"I would not be willing to live with . Bony Huston \" When you complete this sentence it helps your doctor understand your wishes. · Talk openly and honestly with your doctor. This is the best way to understand the decisions you will need to make as your health changes.  Know that you can always change your mind. · Ask your doctor about commonly used life-support measures. These include tube feedings, breathing machines, and fluids given through a vein (IV). Understanding these treatments will help you decide whether you want them. · You may choose to have these life-supporting treatments for a limited time. This allows a trial period to see whether they will help you. You may also decide that you want your doctor to take only certain measures to keep you alive. It is important to spell out these conditions so that your doctor and family understand them. · Talk to your doctor about how long you are likely to live. He or she may be able to give you an idea of what usually happens with your specific illness. · Think about preparing papers that state your wishes. This way there will not be any confusion about what you want. You can change your instructions at any time. Which papers should you prepare? Advance directives are legal papers that tell doctors how you want to be cared for at the end of your life. You do not need a  to write these papers. Ask your doctor or your state health department for information on how to write your advance directives. They may have the forms for each of these types of papers. Make sure your doctor has a copy of these on file, and give a copy to a family member or close friend. · Consider a do-not-resuscitate order (DNR). This order asks that no extra treatments be done if your heart stops or you stop breathing. Extra treatments may include cardiopulmonary resuscitation (CPR), electrical shock to restart your heart, or a machine to breathe for you. If you decide to have a DNR order, ask your doctor to explain and write it. Place the order in your home where everyone can easily see it. · Consider a living will. A living will explains your wishes about life support and other treatments at the end of your life if you become unable to speak for yourself. Living garcia tell doctors to use or not use treatments that would keep you alive. You must have one or two witnesses or a notary present when you sign this form. · Consider a durable power of  for health care. This allows you to name a person to make decisions about your care if you are not able to. Most people ask a close friend or family member. Talk to this person about the kinds of treatments you want and those that you do not want. Make sure this person understands your wishes. These legal papers are simple to change. Tell your doctor what you want to change, and ask him or her to make a note in your medical file. Give your family updated copies of the papers. Where can you learn more? Go to http://margo-kandi.info/. Enter P184 in the search box to learn more about \"Advance Care Planning: Care Instructions. \"  Current as of: September 24, 2016  Content Version: 11.4  © 4552-8988 [employer]wise[employer]. Care instructions adapted under license by [employer] (which disclaims liability or warranty for this information). If you have questions about a medical condition or this instruction, always ask your healthcare professional. [place] [address] any warranty or liability for your use of this information.

## 2018-04-09 NOTE — PROGRESS NOTES
Dennis Hoskins is a 60 y.o. female  Chief Complaint   Patient presents with    Hypertension     1. Have you been to the ER, urgent care clinic since your last visit? Hospitalized since your last visit? No     2. Have you seen or consulted any other health care providers outside of the [address] [place] [address] since your last visit? Include any pap smears or colon screening.   No      Visit Vitals    BP (!) 155/97 (BP 1 Location: Left arm, BP Patient Position: Sitting)    Pulse 89    Temp 98.6 °F (37 °C) (Oral)    Resp 16    Ht 5' 3.5\" (1.613 m)    Wt 137 lb 14.4 oz (62.6 kg)    SpO2 98%    BMI 24.04 kg/m2

## 2018-08-27 ENCOUNTER — HOSPITAL ENCOUNTER (OUTPATIENT)
Dept: MAMMOGRAPHY | Age: 63
Discharge: HOME | End: 2018-08-27
Attending: FAMILY MEDICINE
Payer: COMMERCIAL

## 2018-08-27 DIAGNOSIS — Z12.31 VISIT FOR SCREENING MAMMOGRAM: ICD-10-CM

## 2018-08-27 PROCEDURE — 77067 SCR MAMMO BI INCL CAD: CPT

## 2018-10-09 ENCOUNTER — OFFICE VISIT (OUTPATIENT)
Dept: FAMILY MEDICINE CLINIC | Age: 63
End: 2018-10-09

## 2018-10-09 VITALS
HEIGHT: 63 IN | WEIGHT: 143.3 LBS | HEART RATE: 82 BPM | BODY MASS INDEX: 25.39 KG/M2 | TEMPERATURE: 98.3 F | OXYGEN SATURATION: 98 % | DIASTOLIC BLOOD PRESSURE: 74 MMHG | SYSTOLIC BLOOD PRESSURE: 136 MMHG | RESPIRATION RATE: 16 BRPM

## 2018-10-09 DIAGNOSIS — E78.00 PURE HYPERCHOLESTEROLEMIA: ICD-10-CM

## 2018-10-09 DIAGNOSIS — R73.9 HYPERGLYCEMIA: ICD-10-CM

## 2018-10-09 DIAGNOSIS — M81.0 AGE-RELATED OSTEOPOROSIS WITHOUT CURRENT PATHOLOGICAL FRACTURE: ICD-10-CM

## 2018-10-09 DIAGNOSIS — Z85.3 HISTORY OF LEFT BREAST CANCER: ICD-10-CM

## 2018-10-09 DIAGNOSIS — I10 ESSENTIAL HYPERTENSION: Primary | ICD-10-CM

## 2018-10-09 NOTE — PROGRESS NOTES
Nenita Curiel  Identified pt with two pt identifiers(name and ). Chief Complaint Patient presents with  Blood Pressure Check  Labs 1. Have you been to the ER, urgent care clinic since your last visit? Hospitalized since your last visit? No 
 
2. Have you seen or consulted any other health care providers outside of the [address] [place] [address] since your last visit? Include any pap smears or colon screening. No 
 
In the event something were to happen to you and you were unable to speak on your behalf, do you have an Advance Directive/ Living Will in place stating your wishes? NO If yes, do we have a copy on file NO If no, would you like information:     
 
 
 
Would you like to sign up for MyChart today, if you have not already done so? Patient has a mychart If not, would you like information on MyChart, and how to sign up at a later time? No 
 
 
Medication reconciliation up to date and corrected with patient at this time. Today's provider has been notified of reason for visit, vitals and flowsheets obtained on patients. Reviewed record in preparation for visit, huddled with provider and have obtained necessary documentation. Health Maintenance Due Topic  Hepatitis C Screening  Shingrix Vaccine Age 50> (1 of 2)  DTaP/Tdap/Td series (1 - Tdap)  Influenza Age 5 to Adult Wt Readings from Last 3 Encounters:  
10/09/18 143 lb 4.8 oz (65 kg) 18 137 lb 14.4 oz (62.6 kg) 18 139 lb 9.6 oz (63.3 kg) Temp Readings from Last 3 Encounters:  
18 98.6 °F (37 °C) (Oral) 18 98.5 °F (36.9 °C) (Oral) 17 98.6 °F (37 °C) (Oral) BP Readings from Last 3 Encounters:  
18 132/72  
18 144/83  
17 130/82 Pulse Readings from Last 3 Encounters:  
18 89  
18 84  
17 75 Vitals:  
 10/09/18 1437 BP: 157/89 Pulse: 82 Resp: 16 Temp: 98.3 °F (36.8 °C) TempSrc: Temporal  
SpO2: 98% Weight: 143 lb 4.8 oz (65 kg) Height: 5' 2.64\" (1.591 m) PainSc:   0 - No pain Learning Assessment: 
:  
 
Learning Assessment 8/1/2014 PRIMARY LEARNER Patient HIGHEST LEVEL OF EDUCATION - PRIMARY LEARNER  SOME COLLEGE  
BARRIERS PRIMARY LEARNER NONE PRIMARY LANGUAGE ENGLISH  
LEARNER PREFERENCE PRIMARY PICTURES  
  VIDEOS  
ANSWERED BY patient RELATIONSHIP SELF Depression Screening: 
:  
 
PHQ over the last two weeks 1/9/2018 Little interest or pleasure in doing things Not at all Feeling down, depressed, irritable, or hopeless Not at all Total Score PHQ 2 0 Fall Risk Assessment: 
:  
 
Fall Risk Assessment, last 12 mths 10/9/2018 Able to walk? Yes Fall in past 12 months? No  
 
 
Abuse Screening: 
:  
 
Abuse Screening Questionnaire 10/9/2018 Do you ever feel afraid of your partner? Elma Portal Are you in a relationship with someone who physically or mentally threatens you? Elma Portal Is it safe for you to go home? Y  
 
 
ADL Screening: 
:  
 

## 2018-10-09 NOTE — PROGRESS NOTES
HISTORY OF PRESENT ILLNESS Babatunde Gonsales is a 58 y.o. female presents with Blood Pressure Check and Labs Agree with nurse note. Pt with hypertension, hypercholesterolemia, and hyperglycemia presents to the office with a BP of 136/74. For BP, she takes Diltiazem 360 mg daily, tolerating well. She also uses Aspirin 81 mg daily, tolerating well. Patient denies vision changes, headaches, dizziness, chest pain, SOB, or swelling. Weight is 143 lbs, up 6 lbs since 4/2018. She attributes this to eating more during the summer. She drinks home-brewed green tea with out sugar. She has 12 oz of Coke zero. She also drinks flavored uribe with artifical sweeteners. Health Maintenance Pt's most recent colonoscopy was on 6/24/2010 with Dr. Roshni Ramos. F/U 10 years. Pt reports she had an eye exam this summer and saw Dr. Oksana Gibson. No changes in vision. Pt is scheduled for pap smear next summer with GYN, Dr. Aurea Mcdonald. Pt with osteoporosis. She has an order for an additional DEXA from Dr. Marcia Goodwin. Pt with hx of L breast ca. Pt's most recent mammogram was on 8/27/2018, normal findings. F/U 1 year. Written by kathryn Polk, as dictated by Dr. Susan Collier DO. 
 
ROS Review of Systems negative except as noted above in HPI. ALLERGIES:   
Allergies Allergen Reactions  Percocet [Oxycodone-Acetaminophen] Nausea and Vomiting  
  dizziness  Prednisone Nausea and Vomiting \"withdrawals\" and insomnia  Azithromycin Nausea and Vomiting CURRENT MEDICATIONS:   
Outpatient Prescriptions Marked as Taking for the 10/9/18 encounter (Office Visit) with Rafat Langley DO Medication Sig Dispense Refill  FLUCELVAX QUAD 4972-5547, PF, syrg injection TO BE ADMINISTERED BY PHARMACIST FOR IMMUNIZATION  0  
 dilTIAZem (TIAZAC) 360 mg SR capsule TAKE 1 CAP BY MOUTH DAILY.  INDICATIONS: HYPERTENSION, HEART PALPITATIONS 90 Cap 0  
  loratadine (CLARITIN) 10 mg tablet Take 10 mg by mouth.  aspirin delayed-release 81 mg tablet Take 1 Tab by mouth daily. Indications: prevention of transient ischemic attack (Patient taking differently: Take 39.5 mg by mouth daily. Indications: prevention of transient ischemic attack) 90 Tab 3  
 triamcinolone (NASACORT AQ) 55 mcg nasal inhaler 2 Sprays daily.  calcium 500 mg tab Take  by mouth. Unsure of exact dose.  multivitamin (ONE A DAY) tablet Take 1 Tab by mouth daily. PAST MEDICAL HISTORY:   
Past Medical History:  
Diagnosis Date  Breast cancer, stage 1 ([facility] [address] 2003 Ductal Carcinoma In Situ. Radiation and surgery. Dr. Jolene Anderson. Dr. Ari Soliman.  Chest pain 07/2007 Negative Cardiolite Stress Test.  Dr. Maher Counter  Essential hypertension, benign 07/26/07 Dr. Maher Counter  Fracture of foot 4/30/15  
 5th metatarsal fracture left foot. Dr. Samaria Bass. Workers Comp.  Heart palpitations 07/2007 Dr. Maher Counter  Heartburn  Meniere's disease Dr. Wayne Wayne  Metatarsal fracture 12/2006 Right 5th.  Osteoporosis 08/20/08 Dr. Stephon Ortiz  S/P radiation therapy 2003  Stress fracture of lower leg 06/11/08 Right distal stress reaction. PAST SURGICAL HISTORY:   
Past Surgical History:  
Procedure Laterality Date  HX BREAST LUMPECTOMY  2003  
 left due to cancer with radiation  HX COLONOSCOPY  06/24/2010 Dr. Santana Paget. due q 10 yrs.  HX POLYPECTOMY  04/2008  
 uterine  Dr. Micheal Arreola  HX TONSILLECTOMY  childhood [facility] Dr. Yuval Nolen FAMILY HISTORY:   
Family History Problem Relation Age of Onset  Heart Attack Mother 72  
 Heart Disease Mother CAD  High Cholesterol Mother  Other Mother   
  advanced Meniere's Disease  Cancer Mother 80 LUNG  Diabetes Father  High Cholesterol Sister  Seizures Sister  Other Sister   MACULAR DEGENERATION  
  Breast Cancer Paternal Grandmother   
  pt was in her [de-identified] SOCIAL HISTORY:   
Social History Social History  Marital status:  Spouse name: N/A  
 Number of children: N/A  
 Years of education: N/A Social History Main Topics  Smoking status: Former Smoker Types: Cigarettes Quit date: 1/1/1972  Smokeless tobacco: Never Used Comment: smoked x6 mos at 23 y/o  Alcohol use No  
 Drug use: No  
 Sexual activity: Yes  
  Partners: Male Birth control/ protection: Surgical  
   Comment: TL Other Topics Concern  None Social History Narrative IMMUNIZATIONS:   
Immunization History Administered Date(s) Administered  H1N1 Influenza Virus Vaccine 11/01/2009  Influenza Vaccine 10/13/2015, 01/05/2016, 09/26/2018  Influenza Vaccine (Quad) PF 09/29/2017  Influenza Vaccine Split 11/01/2010, 11/01/2011  Influenza Vaccine Whole 09/01/2009  Pneumococcal Polysaccharide (PPSV-23) 03/17/2017  TD Vaccine 08/01/2009 PHYSICAL EXAMINATION Vital Signs Visit Vitals  /74 (BP 1 Location: Left arm, BP Patient Position: Sitting) Comment: taken manually  Pulse 82  Temp 98.3 °F (36.8 °C) (Temporal)  Resp 16  
 Ht 5' 2.64\" (1.591 m)  Wt 143 lb 4.8 oz (65 kg)  SpO2 98%  BMI 25.68 kg/m2 Weight Metrics 10/9/2018 4/9/2018 1/9/2018 9/29/2017 3/17/2017 3/2/2016 1/2/2015 Weight 143 lb 4.8 oz 137 lb 14.4 oz 139 lb 9.6 oz 143 lb 1.6 oz 140 lb 14.4 oz 141 lb 140 lb 3.2 oz  
BMI 25.68 kg/m2 24.04 kg/m2 24.34 kg/m2 24.95 kg/m2 24.56 kg/m2 24.58 kg/m2 24.44 kg/m2 General appearance - Well nourished. Well appearing. Well developed. No acute distress. Overweight. Head - Normocephalic. Atraumatic. Eyes - pupils equal and reactive. Extraocular eye movements intact. Sclera anicteric. Mildly injected sclera. Ears - Hearing is grossly normal bilaterally. Nose - normal and patent. No polyps noted. No erythema. No discharge. Mouth - mucous membranes with adequate moisture. Posterior pharynx normal with cobblestone appearance. No erythema, white exudate or obstruction. Neck - supple. Midline trachea. No carotid bruits noted bilaterally. No thyromegaly noted. Chest - clear to auscultation bilaterally anteriorly and posteriorly. No wheezes. No rales or rhonchi. Breath sounds are symmetrical bilaterally. Unlabored respirations. Heart - normal rate. Regular rhythm. Normal S1, S2. No murmur noted. No rubs, clicks or gallops noted. Abdomen - soft and distended. No masses or organomegaly. No rebound, rigidity or guarding. Bowel sounds normal x 4 quadrants. No tenderness noted. Neurological - awake, alert and oriented to person, place, and time and event. Cranial nerves II through XII intact. Clear speech. Muscle strength is +5/5 x 4 extremities. Sensation is intact to light touch bilaterally. Steady gait. Heme/Lymph - peripheral pulses normal x 4 extremities. No peripheral edema is noted. Musculoskeletal - Intact x 4 extremities. Full ROM x 4 extremities. No pain with movement. Back exam - normal range of motion. No pain on palpation of the spinous processes in the cervical, thoracic, lumbar, sacral regions. No CVA tenderness. Skin - no rashes, erythema, ecchymosis, lacerations, abrasions, suspicious moles noted Psychological -   normal behavior, dress and thought processes. Good insight. Good eye contact. Normal affect. Appropriate mood. Normal speech. DATA REVIEWED Lab Results Component Value Date/Time WBC 5.1 07/29/2014 09:33 AM  
 HGB 12.5 07/29/2014 09:33 AM  
 HCT 38.6 07/29/2014 09:33 AM  
 PLATELET 348 10/61/7318 09:33 AM  
 MCV 89 07/29/2014 09:33 AM  
 
Lab Results Component Value Date/Time  Sodium 143 09/25/2017 08:21 AM  
 Potassium 4.3 09/25/2017 08:21 AM  
 Chloride 103 09/25/2017 08:21 AM  
 CO2 27 09/25/2017 08:21 AM  
 Glucose 86 09/25/2017 08:21 AM  
 BUN 14 09/25/2017 08:21 AM  
 Creatinine 0.74 09/25/2017 08:21 AM  
 BUN/Creatinine ratio 19 09/25/2017 08:21 AM  
 GFR est  09/25/2017 08:21 AM  
 GFR est non-AA 86 09/25/2017 08:21 AM  
 Calcium 9.3 09/25/2017 08:21 AM  
 Bilirubin, total 0.3 09/25/2017 08:21 AM  
 AST (SGOT) 17 09/25/2017 08:21 AM  
 Alk. phosphatase 84 09/25/2017 08:21 AM  
 Protein, total 6.5 09/25/2017 08:21 AM  
 Albumin 4.3 09/25/2017 08:21 AM  
 A-G Ratio 2.0 09/25/2017 08:21 AM  
 ALT (SGPT) 14 09/25/2017 08:21 AM  
 
Lab Results Component Value Date/Time Cholesterol, total 222 (H) 09/25/2017 08:21 AM  
 HDL Cholesterol 89 09/25/2017 08:21 AM  
 LDL, calculated 116 (H) 09/25/2017 08:21 AM  
 VLDL, calculated 17 09/25/2017 08:21 AM  
 Triglyceride 87 09/25/2017 08:21 AM  
 
Lab Results Component Value Date/Time Vitamin D 25-Hydroxy 37.1 06/20/2011 08:19 AM  
 VITAMIN D, 25-HYDROXY 42.7 07/29/2014 09:33 AM  
   
Lab Results Component Value Date/Time Hemoglobin A1c 5.7 (H) 09/25/2017 08:21 AM  
 
Lab Results Component Value Date/Time TSH 1.700 09/25/2017 08:21 AM  
 
 
 
 
ASSESSMENT and PLAN 
 
  ICD-10-CM ICD-9-CM 1. Essential hypertension I10 401.9 TSH 3RD GENERATION 2. Pure hypercholesterolemia E78.00 272.0 3. Hyperglycemia R73.9 790.29   
4. History of left breast cancer Z85.3 V10.3 without recurrence 5. Age-related osteoporosis without current pathological fracture M81.0 733.01   
 stable Discussed the patient's BMI with her. The BMI follow up plan is as follows: Pt not eligible for BMI calculation due to normal BMI. Decrease carbohydrates (white foods, sweet foods, sweet drinks and alcohol), increase green leafy vegetables and protein (lean meats and beans) with each meal.  Avoid fried foods. Eat 3-5 small meals daily. Do not skip meals. Increase water intake.     Increase physical activity to 30 minutes daily for health benefit or 60 minutes daily to prevent weight regain, as tolerated. Get 7-8 hours uninterrupted sleep nightly. Chart reviewed and updated. Continue current medications and care. Recheck pertinent fasting labs when able. Get recent office visit notes from Dr. Dot Gutierrez, Dr. Alfredo Allred. Advised pt to sign release. Counseled patient on health concerns:  Hypertension, hyperglycemia, cholesterol, hx of breast ca, and osteoporosis. Immunizations noted; Advised pt to discuss Shingrix vaccine with insurance company and local pharmacy. Offered empathy, support, legitimation, prayers, partnership to patient. Praised patient for progress. Pt declines additional ACP handout and will bring a copy of ACP when ready. Follow-up Disposition: 
Return in about 6 months (around 4/9/2019) for blood pressure, referral follow up, results. Patient was offered a choice/choices in the treatment plan today. Patient expresses understanding of the plan and agrees with recommendations. Patient declines any additional handouts. Patient is satisfied with previous handouts received from our office Written by kathryn Mooney, as dictated by Dr. Angelica Moore DO. Documentation True and Accepted by Yevgeniy Malave.  Emma Falk.

## 2018-10-09 NOTE — MR AVS SNAPSHOT
[address] [place] [address] [place] 
[address] [facility] [address] 
[phone] Patient: Julieta Leyva MRN: [id number] Visit Information Date & Time Provider Department Dept. Phone Encounter #  
 10/9/2018  2:30 PM Jimmie Sanchez DO [facility] [phone] [id number] Follow-up Instructions Return in about 6 months (around 2019) for blood pressure, referral follow up, results. Upcoming Health Maintenance Date Due Influenza Age 5 to Adult 2018 Hepatitis C Screening 3/8/2019* Shingrix Vaccine Age 50> (1 of 2) 2019* DTaP/Tdap/Td series (1 - Tdap) 2019* PAP AKA CERVICAL CYTOLOGY 7/3/2020 BREAST CANCER SCRN MAMMOGRAM 2020 COLONOSCOPY 2021 *Topic was postponed. The date shown is not the original due date. Allergies as of 10/9/2018  Review Complete On: 10/9/2018 By: Ivan Vieyra Severity Noted Reaction Type Reactions Percocet [Oxycodone-acetaminophen]  01/15/2010    Nausea and Vomiting  
 dizziness Prednisone  01/15/2010    Nausea and Vomiting \"withdrawals\" and insomnia Azithromycin Low 2017   Not Verified Nausea and Vomiting Current Immunizations  Reviewed on 10/9/2018 Name Date H1N1 FLU VACCINE 2009 Influenza Vaccine 2018, 2016, 10/13/2015 Influenza Vaccine (Quad) PF 2017 Influenza Vaccine Split 2011, 2010 Influenza Vaccine Whole 2009 Pneumococcal Polysaccharide (PPSV-23) 3/17/2017 TD Vaccine 2009 Reviewed by Jimmie Sanchez DO on 10/9/2018 at  3:40 PM  
You Were Diagnosed With   
  
 Codes Comments Essential hypertension    -  Primary ICD-10-CM: I10 
ICD-9-CM: 401.9 Pure hypercholesterolemia     ICD-10-CM: E78.00 ICD-9-CM: 272.0 Hyperglycemia     ICD-10-CM: R73.9 ICD-9-CM: 790.29 History of left breast cancer     ICD-10-CM: Z85.3 ICD-9-CM: V10.3 without recurrence Age-related osteoporosis without current pathological fracture     ICD-10-CM: M81.0 ICD-9-CM: 733.01 stable Vitals BP Pulse Temp Resp Height(growth percentile) 136/74 (BP 1 Location: Left arm, BP Patient Position: Sitting) 82 98.3 °F (36.8 °C) (Temporal) 16 5' 2.64\" (1.591 m) Weight(growth percentile) SpO2 BMI OB Status Smoking Status 143 lb 4.8 oz (65 kg) 98% 25.68 kg/m2 Postmenopausal Former Smoker Vitals History BMI and BSA Data Body Mass Index Body Surface Area  
 25.68 kg/m 2 1.69 m 2 Preferred Pharmacy Pharmacy Name Phone [id number] [address] [phone] Your Updated Medication List  
  
   
This list is accurate as of 10/9/18  3:46 PM.  Always use your most recent med list.  
  
  
  
  
 aspirin delayed-release 81 mg tablet Take 1 Tab by mouth daily. Indications: prevention of transient ischemic attack  
  
 calcium 500 mg Tab Take  by mouth. Unsure of exact dose. CLARITIN 10 mg tablet Generic drug:  loratadine Take 10 mg by mouth. dilTIAZem 360 mg SR capsule Commonly known as:  [facility] TAKE 1 CAP BY MOUTH DAILY. INDICATIONS: HYPERTENSION, HEART PALPITATIONS FLUCELVAX QUAD 8567-1209 (PF) Syrg injection Generic drug:  influenza vaccine 2018-19 (4 yrs+)(PF)  
TO BE ADMINISTERED BY PHARMACIST FOR IMMUNIZATION  
  
 multivitamin tablet Commonly known as:  ONE A DAY Take 1 Tab by mouth daily. NASACORT AQ 55 mcg nasal inhaler Generic drug:  triamcinolone 2 Sprays daily. We Performed the Following TSH 3RD GENERATION [32199 CPT(R)] Follow-up Instructions Return in about 6 months (around 4/9/2019) for blood pressure, referral follow up, results. Introducing [facility]! Dear Lauryn Bertrand: 
Thank you for requesting a [employer] account.   Our records indicate that you already have an active [employer] account. You can access your account anytime at [email or web address] Did you know that you can access your hospital and ER discharge instructions at any time in [employer]? You can also review all of your test results from your hospital stay or ER visit. Additional Information If you have questions, please visit the Frequently Asked Questions section of the [employer] website at [email or web address] Remember, [employer] is NOT to be used for urgent needs. For medical emergencies, dial 911. Now available from your iPhone and Android! Please provide this summary of care documentation to your next provider. Your primary care clinician is listed as Deandre Kinsey. If you have any questions after today's visit, please call [phone].

## 2019-01-31 ENCOUNTER — OFFICE VISIT (OUTPATIENT)
Dept: FAMILY MEDICINE CLINIC | Age: 64
End: 2019-01-31

## 2019-01-31 VITALS
HEART RATE: 87 BPM | TEMPERATURE: 98.5 F | WEIGHT: 143.8 LBS | DIASTOLIC BLOOD PRESSURE: 83 MMHG | SYSTOLIC BLOOD PRESSURE: 139 MMHG | RESPIRATION RATE: 17 BRPM | OXYGEN SATURATION: 97 % | BODY MASS INDEX: 26.46 KG/M2 | HEIGHT: 62 IN

## 2019-01-31 DIAGNOSIS — R23.3 EASY BRUISABILITY: ICD-10-CM

## 2019-01-31 DIAGNOSIS — M81.0 AGE-RELATED OSTEOPOROSIS WITHOUT CURRENT PATHOLOGICAL FRACTURE: Primary | ICD-10-CM

## 2019-01-31 DIAGNOSIS — R73.9 HYPERGLYCEMIA: ICD-10-CM

## 2019-01-31 DIAGNOSIS — R12 HEARTBURN: ICD-10-CM

## 2019-01-31 DIAGNOSIS — I10 ESSENTIAL HYPERTENSION: ICD-10-CM

## 2019-01-31 RX ORDER — OMEPRAZOLE 40 MG/1
40 CAPSULE, DELAYED RELEASE ORAL DAILY
Qty: 90 CAP | Refills: 1 | Status: SHIPPED | OUTPATIENT
Start: 2019-01-31 | End: 2019-07-01 | Stop reason: ALTCHOICE

## 2019-01-31 RX ORDER — OMEPRAZOLE 40 MG/1
CAPSULE, DELAYED RELEASE ORAL
Refills: 0 | COMMUNITY
Start: 2019-01-22 | End: 2019-01-31 | Stop reason: SDUPTHER

## 2019-01-31 NOTE — PROGRESS NOTES
Oksana Flynn  Identified pt with two pt identifiers(name and ). Chief Complaint   Patient presents with    Follow-up     Rm 13 Gerd         1. Have you been to the ER, urgent care clinic since your last visit? Hospitalized since your last visit? YES pt First 19 dx w/GERD [facility] [address] received a negative strep test     2. Have you seen or consulted any other health care providers outside of the [address] [place] [address] since your last visit? Include any pap smears or colon screening. NO      Advance Care Planning    In the event something were to happen to you and you were unable to speak on your behalf, do you have an Advance Directive/ Living Will in place stating your wishes? NO    If yes, do we have a copy on file NO    If no, would you like information YES    My Chart     My chart gives you direct online access to portions of the electronic medical record (EMR) where your doctor stores your health information (ie, lab results, appointment information, medications, immunizations, and more. It is free. Would you like to set up your my chart? YES    [unfilled]    Weight Metrics 2019 10/9/2018 2018 2018 2017 3/17/2017 3/2/2016   Weight 143 lb 12.8 oz 143 lb 4.8 oz 137 lb 14.4 oz 139 lb 9.6 oz 143 lb 1.6 oz 140 lb 14.4 oz 141 lb   BMI 26.3 kg/m2 25.68 kg/m2 24.04 kg/m2 24.34 kg/m2 24.95 kg/m2 24.56 kg/m2 24.58 kg/m2       Medication reconciliation up to date and corrected with patient at this time. Today's provider has been notified of reason for visit, vitals and flowsheets obtained on patients. Reviewed record in preparation for visit, huddled with provider and have obtained necessary documentation. There are no preventive care reminders to display for this patient.     Wt Readings from Last 3 Encounters:   19 143 lb 12.8 oz (65.2 kg)   10/09/18 143 lb 4.8 oz (65 kg)   18 137 lb 14.4 oz (62.6 kg)     Temp Readings from Last 3 Encounters:   01/31/19 98.5 °F (36.9 °C) (Oral)   10/09/18 98.3 °F (36.8 °C) (Temporal)   04/09/18 98.6 °F (37 °C) (Oral)     BP Readings from Last 3 Encounters:   01/31/19 139/83   10/09/18 136/74   04/09/18 132/72     Pulse Readings from Last 3 Encounters:   01/31/19 87   10/09/18 82   04/09/18 89     Vitals:    01/31/19 1023   BP: 139/83   Pulse: 87   Resp: 17   Temp: 98.5 °F (36.9 °C)   TempSrc: Oral   SpO2: 97%   Weight: 143 lb 12.8 oz (65.2 kg)   Height: 5' 2\" (1.575 m)   PainSc:   0 - No pain         Learning Assessment:  :     Learning Assessment 8/1/2014   PRIMARY LEARNER Patient   HIGHEST LEVEL OF EDUCATION - PRIMARY LEARNER  SOME COLLEGE   BARRIERS PRIMARY LEARNER NONE   PRIMARY LANGUAGE ENGLISH   LEARNER PREFERENCE PRIMARY PICTURES     VIDEOS   ANSWERED BY patient   RELATIONSHIP SELF       Depression Screening:  :     PHQ over the last two weeks 1/9/2018   Little interest or pleasure in doing things Not at all   Feeling down, depressed, irritable, or hopeless Not at all   Total Score PHQ 2 0       Fall Risk Assessment:  :     Fall Risk Assessment, last 12 mths 10/9/2018   Able to walk? Yes   Fall in past 12 months? No       Abuse Screening:  :     Abuse Screening Questionnaire 10/9/2018   Do you ever feel afraid of your partner? N   Are you in a relationship with someone who physically or mentally threatens you? N   Is it safe for you to go home?  Y       ADL Screening:  :     ADL Assessment 10/9/2018   Feeding yourself No Help Needed   Getting from bed to chair No Help Needed   Getting dressed No Help Needed   Bathing or showering No Help Needed   Walk across the room (includes cane/walker) No Help Needed   Using the telphone No Help Needed   Taking your medications No Help Needed   Preparing meals No Help Needed   Managing money (expenses/bills) No Help Needed   Moderately strenuous housework (laundry) No Help Needed   Shopping for personal items (toiletries/medicines) No Help Needed   Shopping for groceries No Help Needed   Driving No Help Needed   Climbing a flight of stairs No Help Needed   Getting to places beyond walking distances No Help Needed

## 2019-01-31 NOTE — PATIENT INSTRUCTIONS
Indigestion (Dyspepsia or Heartburn): Care Instructions  Your Care Instructions  Sometimes it can be hard to pinpoint the cause of indigestion. (It is also called dyspepsia or heartburn.) Most cases of an upset stomach with bloating, burning, burping, and nausea are minor and go away within several hours. Home treatment and over-the-counter medicine often are able to control symptoms. But if you take medicine to relieve your indigestion without making diet and lifestyle changes, your symptoms are likely to return again and again. If you get indigestion often, it may be a sign of a more serious medical problem. Be sure to follow up with your doctor, who may want to do tests to be sure of the cause of your indigestion. Follow-up care is a key part of your treatment and safety. Be sure to make and go to all appointments, and call your doctor if you are having problems. It's also a good idea to know your test results and keep a list of the medicines you take. How can you care for yourself at home? · Your doctor may recommend over-the-counter medicine. For mild or occasional indigestion, antacids such as Gaviscon, Mylanta, Maalox, or Tums, may help. Be safe with medicines. Be careful when you take over-the-counter antacid medicines. Many of these medicines have aspirin in them. Read the label to make sure that you are not taking more than the recommended dose. Too much aspirin can be harmful. · Your doctor also may recommend over-the-counter acid reducers, such as Pepcid AC, Tagamet HB, Zantac 75, or Prilosec. Read and follow all instructions on the label. If you use these medicines often, talk with your doctor. · Change your eating habits. ? It's best to eat several small meals instead of two or three large meals. ? After you eat, wait 2 to 3 hours before you lie down. ? Chocolate, mint, and alcohol can make GERD worse. ?  Spicy foods, foods that have a lot of acid (like tomatoes and oranges), and coffee can make GERD symptoms worse in some people. If your symptoms are worse after you eat a certain food, you may want to stop eating that food to see if your symptoms get better. · Do not smoke or chew tobacco. Smoking can make GERD worse. If you need help quitting, talk to your doctor about stop-smoking programs and medicines. These can increase your chances of quitting for good. · If you have GERD symptoms at night, raise the head of your bed 6 to 8 inches. You can do this by putting the frame on blocks or placing a foam wedge under the head of your mattress. (Adding extra pillows does not work.)  · Do not wear tight clothing around your middle. · Lose weight if you need to. Losing just 5 to 10 pounds can help. · Do not take anti-inflammatory medicines, such as aspirin, ibuprofen (Advil, Motrin), or naproxen (Aleve). These can irritate the stomach. If you need a pain medicine, try acetaminophen (Tylenol), which does not cause stomach upset. When should you call for help? Call your doctor now or seek immediate medical care if:    · You have new or worse belly pain.     · You are vomiting.    Watch closely for changes in your health, and be sure to contact your doctor if:    · You have new or worse symptoms of indigestion.     · You have trouble or pain swallowing.     · You are losing weight.     · You do not get better as expected. Where can you learn more? Go to http://margo-kandi.info/. Enter G374 in the search box to learn more about \"Indigestion (Dyspepsia or Heartburn): Care Instructions. \"  Current as of: March 27, 2018  Content Version: 11.9  © 4705-2542 [employer]. Care instructions adapted under license by [employer] [facility] (which disclaims liability or warranty for this information).  If you have questions about a medical condition or this instruction, always ask your healthcare professional. [address] any warranty or liability for your use of this information. HEARTBURN occurs when stomach acid moves back up through your esophagus. Several conditions and foods can contribute to this process. Common causes include:    Hiatal Hernia  Pregnancy  Alcohol use  Overweight or Obesity  Smoking. Consuming certain types of foods or drinks can increase the acid content of the stomach and cause heartburn. AVOID THESE TRIGGERS:     Stress  Alcoholic or carbonated beverages  Caffeine (coffee, tea, chocolate, soda)  Acidic foods or drinks (Oranges or orange juice, apples, apple juice, grapefruit or grapefruit juice, bananas, grape juice)  Tomatoes or tomato based foods (pizza, spaghetti, chili)  Greasy, Fatty or Fried foods  Spicy Foods (including hot sauce)  Garlic  Onions  Mint flavoring (peppermint, speramint, cinnamon). PREVENTIVE MEASURES    Do not wear tight, restrictive clothing. Lose weight. Elevate the head of the bed 4 to 6 inches with blocks or a wedge pillow. Wait 2 to 3 hours after a meal before lying down. Avoid the triggers. MEDICATIONS     Try over the counter medications if needed first.  These include:  TUMS, ROLAIDS, Mylanta, Prilosec 20 mg, Pepcid 20 mg, Tagamet, Zantac up to 150 mg twice daily or 300 mg daily, Nexium 20 mg up to 40 mg daily and Prevacid up to 30 mg daily. Ideally, take for 2 weeks after symptoms consistently appear. NOTIFY OUR OFFICE    If symptoms worsen or persist.   If breathing or swallowing becomes difficult. If you regurgitate blood. DIAL 911 IF THE HEART BURN SYMPTOMS ARE ACCOMPANIED BY   Shortness of breath, sweating, pain in the jaw, neck or arm, cold clammy feeling with nausea or vomiting. This could be a HEART ATTACK. Indigestion (Dyspepsia or Heartburn): Care Instructions  Your Care Instructions  Sometimes it can be hard to pinpoint the cause of indigestion.  (It is also called dyspepsia or heartburn.) Most cases of an upset stomach with bloating, burning, burping, and nausea are minor and go away within several hours. Home treatment and over-the-counter medicine often are able to control symptoms. But if you take medicine to relieve your indigestion without making diet and lifestyle changes, your symptoms are likely to return again and again. If you get indigestion often, it may be a sign of a more serious medical problem. Be sure to follow up with your doctor, who may want to do tests to be sure of the cause of your indigestion. Follow-up care is a key part of your treatment and safety. Be sure to make and go to all appointments, and call your doctor if you are having problems. It's also a good idea to know your test results and keep a list of the medicines you take. How can you care for yourself at home? · Your doctor may recommend over-the-counter medicine. For mild or occasional indigestion, antacids such as Gaviscon, Mylanta, Maalox, or Tums, may help. Be safe with medicines. Be careful when you take over-the-counter antacid medicines. Many of these medicines have aspirin in them. Read the label to make sure that you are not taking more than the recommended dose. Too much aspirin can be harmful. · Your doctor also may recommend over-the-counter acid reducers, such as Pepcid AC, Tagamet HB, Zantac 75, or Prilosec. Read and follow all instructions on the label. If you use these medicines often, talk with your doctor. · Change your eating habits. ? It's best to eat several small meals instead of two or three large meals. ? After you eat, wait 2 to 3 hours before you lie down. ? Chocolate, mint, and alcohol can make GERD worse. ? Spicy foods, foods that have a lot of acid (like tomatoes and oranges), and coffee can make GERD symptoms worse in some people. If your symptoms are worse after you eat a certain food, you may want to stop eating that food to see if your symptoms get better. · Do not smoke or chew tobacco. Smoking can make GERD worse.  If you need help quitting, talk to your doctor about stop-smoking programs and medicines. These can increase your chances of quitting for good. · If you have GERD symptoms at night, raise the head of your bed 6 to 8 inches. You can do this by putting the frame on blocks or placing a foam wedge under the head of your mattress. (Adding extra pillows does not work.)  · Do not wear tight clothing around your middle. · Lose weight if you need to. Losing just 5 to 10 pounds can help. · Do not take anti-inflammatory medicines, such as aspirin, ibuprofen (Advil, Motrin), or naproxen (Aleve). These can irritate the stomach. If you need a pain medicine, try acetaminophen (Tylenol), which does not cause stomach upset. When should you call for help? Call your doctor now or seek immediate medical care if:    · You have new or worse belly pain.     · You are vomiting.    Watch closely for changes in your health, and be sure to contact your doctor if:    · You have new or worse symptoms of indigestion.     · You have trouble or pain swallowing.     · You are losing weight.     · You do not get better as expected. Where can you learn more? Go to http://margo-kandi.info/. Enter G752 in the search box to learn more about \"Indigestion (Dyspepsia or Heartburn): Care Instructions. \"  Current as of: March 27, 2018  Content Version: 11.9  © 7672-0968 [employer]. Care instructions adapted under license by [employer] (which disclaims liability or warranty for this information). If you have questions about a medical condition or this instruction, always ask your healthcare professional. [address] any warranty or liability for your use of this information.

## 2019-01-31 NOTE — PROGRESS NOTES
HISTORY OF PRESENT ILLNESS  Jorden Juan is a 58 y.o. female presents with ED Follow-up and Heartburn    Agree with nurse note. Pt with hyperglycemia, hypertension, and osteoporosis presents to the office with a BP of 139/83. Patient denies vision changes, headaches, dizziness, chest pain, SOB, or swelling. For BP, she uses Diltiazem 360 mg daily, tolerating well. She notes she stopped using Asprin 81 mg due to easy bruising. Pt reports she went to [facility] because her throat was swollen and she thought she may have strep. She notes she had a lot of [place] food and was overeating. She had been burping and had some stomach upset. She used Pepcid without relief. She was dx'd with acid reflux and rx'd Omeprazole 40 mg. She has starting using Omeprazole 40 mg qam with relief. She has been sleeping with her bed proped up and has avoided her triggers, such as coffee and diet coke. She has also been eating smaller meals. She is feeling better now but wonders what the next step is. She shares she also went to the [facility] at [facility] to ensure she did not have strep and strep test was negative. Written by kathryn Craig, as dictated by Dr. Adam Del Valle DO.    ROS    Review of Systems negative except as noted above in HPI. ALLERGIES:    Allergies   Allergen Reactions    Percocet [Oxycodone-Acetaminophen] Nausea and Vomiting     dizziness    Prednisone Nausea and Vomiting     \"withdrawals\" and insomnia    Azithromycin Nausea and Vomiting       CURRENT MEDICATIONS:    Outpatient Medications Marked as Taking for the 1/31/19 encounter (Office Visit) with Brannon Greer DO   Medication Sig Dispense Refill    omeprazole (PRILOSEC) 40 mg capsule Take 1 Cap by mouth daily. 90 Cap 1    dilTIAZem (TIAZAC) 360 mg SR capsule TAKE 1 CAP BY MOUTH DAILY. INDICATIONS: HYPERTENSION, HEART PALPITATIONS 90 Cap 3    loratadine (CLARITIN) 10 mg tablet Take 10 mg by mouth.       triamcinolone (NASACORT AQ) 55 mcg nasal inhaler 2 Sprays daily.  calcium 500 mg tab Take  by mouth. Unsure of exact dose.  multivitamin (ONE A DAY) tablet Take 1 Tab by mouth daily. PAST MEDICAL HISTORY:    Past Medical History:   Diagnosis Date    Breast cancer, stage 1 ([address] 2003    Ductal Carcinoma In Situ. Radiation and surgery. Dr. Russ Bang. Dr. Yusra Hayden.  CAD (coronary artery disease)     Chest pain 07/2007    Negative Cardiolite Stress Test.  Dr. Hal Melgar Essential hypertension, benign 07/26/07    Dr. Hal Melgar Fracture of foot 4/30/15    5th metatarsal fracture left foot. Dr. Kacey Dey. [employer].  Heart palpitations 07/2007    Dr. Frances Lopez disease     Dr. Maddi Mejia Metatarsal fracture 12/2006    Right 5th.  Osteoporosis 08/20/08    Dr. Matthew Quach S/P radiation therapy 2003    Stress fracture of lower leg 06/11/08    Right distal stress reaction. PAST SURGICAL HISTORY:    Past Surgical History:   Procedure Laterality Date    HX BREAST LUMPECTOMY  2003    left due to cancer with radiation    HX COLONOSCOPY  06/24/2010    Dr. Hien Aldrich. due q 10 yrs.     HX POLYPECTOMY  04/2008    uterine  Dr. Isaac Rincon  childhood    Blair Gudino       FAMILY HISTORY:    Family History   Problem Relation Age of Onset    Heart Attack Mother 72    Heart Disease Mother         CAD    High Cholesterol Mother     Other Mother         advanced Meniere's Disease    Cancer Mother 80        LUNG    Diabetes Father     High Cholesterol Sister     Seizures Sister     Other Sister         MACULAR DEGENERATION    Breast Cancer Paternal Grandmother         pt was in her [de-identified]       SOCIAL HISTORY:    Social History     Socioeconomic History    Marital status:      Spouse name: Not on file    Number of children: Not on file    Years of education: Not on file    Highest education level: Not on file   Tobacco Use    Smoking status: Former Smoker     Types: Cigarettes     Last attempt to quit: 1972     Years since quittin.1    Smokeless tobacco: Never Used    Tobacco comment: smoked x6 mos at 21 y/o   Substance and Sexual Activity    Alcohol use: No    Drug use: No    Sexual activity: Yes     Partners: Male     Birth control/protection: Surgical     Comment: TL       IMMUNIZATIONS:    Immunization History   Administered Date(s) Administered    H1N1 Influenza Virus Vaccine 2009    Influenza Vaccine 10/13/2015, 2016, 2018    Influenza Vaccine (Quad) PF 2017    Influenza Vaccine Split 2010, 2011    Influenza Vaccine Whole 2009    Pneumococcal Polysaccharide (PPSV-23) 2017    TD Vaccine 2009         PHYSICAL EXAMINATION    Vital Signs    Visit Vitals  /83 (BP 1 Location: Left arm, BP Patient Position: Sitting)   Pulse 87   Temp 98.5 °F (36.9 °C) (Oral)   Resp 17   Ht 5' 2\" (1.575 m)   Wt 143 lb 12.8 oz (65.2 kg)   SpO2 97%   BMI 26.30 kg/m²       Weight Metrics 2019 10/9/2018 2018 2018 2017 3/17/2017 3/2/2016   Weight 143 lb 12.8 oz 143 lb 4.8 oz 137 lb 14.4 oz 139 lb 9.6 oz 143 lb 1.6 oz 140 lb 14.4 oz 141 lb   BMI 26.3 kg/m2 25.68 kg/m2 24.04 kg/m2 24.34 kg/m2 24.95 kg/m2 24.56 kg/m2 24.58 kg/m2       General appearance - Well nourished. Well appearing. Well developed. No acute distress. Overweight. Head - Normocephalic. Atraumatic. Eyes - pupils equal and reactive. Extraocular eye movements intact. Sclera anicteric. Mildly injected sclera. Ears - Hearing is grossly normal bilaterally. Nose - normal and patent. No polyps noted. No erythema. No discharge. Mouth - mucous membranes with adequate moisture. Posterior pharynx normal with cobblestone appearance. No erythema, white exudate or obstruction. Neck - supple. Midline trachea. No carotid bruits noted bilaterally.   No thyromegaly noted. Chest - clear to auscultation bilaterally anteriorly and posteriorly. No wheezes. No rales or rhonchi. Breath sounds are symmetrical bilaterally. Unlabored respirations. Heart - normal rate. Regular rhythm. Normal S1, S2. No murmur noted. No rubs, clicks or gallops noted. Abdomen - soft and distended. No masses or organomegaly. No rebound, rigidity or guarding. Bowel sounds normal x 4 quadrants. No tenderness noted. Neurological - awake, alert and oriented to person, place, and time and event. Cranial nerves II through XII intact. Clear speech. Muscle strength is +5/5 x 4 extremities. Sensation is intact to light touch bilaterally. Steady gait. Psychological -   normal behavior, dress and thought processes. Good insight. Good eye contact. Normal affect. Appropriate mood. Normal speech. DATA REVIEWED    Lab Results   Component Value Date/Time    WBC 5.1 07/29/2014 09:33 AM    HGB 12.5 07/29/2014 09:33 AM    HCT 38.6 07/29/2014 09:33 AM    PLATELET 385 42/28/0577 09:33 AM    MCV 89 07/29/2014 09:33 AM     Lab Results   Component Value Date/Time    Sodium 143 09/25/2017 08:21 AM    Potassium 4.3 09/25/2017 08:21 AM    Chloride 103 09/25/2017 08:21 AM    CO2 27 09/25/2017 08:21 AM    Glucose 86 09/25/2017 08:21 AM    BUN 14 09/25/2017 08:21 AM    Creatinine 0.74 09/25/2017 08:21 AM    BUN/Creatinine ratio 19 09/25/2017 08:21 AM    GFR est  09/25/2017 08:21 AM    GFR est non-AA 86 09/25/2017 08:21 AM    Calcium 9.3 09/25/2017 08:21 AM    Bilirubin, total 0.3 09/25/2017 08:21 AM    AST (SGOT) 17 09/25/2017 08:21 AM    Alk.  phosphatase 84 09/25/2017 08:21 AM    Protein, total 6.5 09/25/2017 08:21 AM    Albumin 4.3 09/25/2017 08:21 AM    A-G Ratio 2.0 09/25/2017 08:21 AM    ALT (SGPT) 14 09/25/2017 08:21 AM     Lab Results   Component Value Date/Time    Cholesterol, total 222 (H) 09/25/2017 08:21 AM    HDL Cholesterol 89 09/25/2017 08:21 AM    LDL, calculated 116 (H) 09/25/2017 08:21 AM    VLDL, calculated 17 09/25/2017 08:21 AM    Triglyceride 87 09/25/2017 08:21 AM     Lab Results   Component Value Date/Time    Vitamin D 25-Hydroxy 37.1 06/20/2011 08:19 AM    VITAMIN D, 25-HYDROXY 42.7 07/29/2014 09:33 AM       Lab Results   Component Value Date/Time    Hemoglobin A1c 5.7 (H) 09/25/2017 08:21 AM     Lab Results   Component Value Date/Time    TSH 1.700 09/25/2017 08:21 AM         ASSESSMENT and PLAN      ICD-10-CM ICD-9-CM    1. Age-related osteoporosis without current pathological fracture M81.0 733.01    2. Hyperglycemia R73.9 790.29    3. Easy bruisability R23.8 782.9    4. Essential hypertension I10 401.9    5. Heartburn R12 787.1 omeprazole (PRILOSEC) 40 mg capsule       Discussed the patient's BMI with her. The BMI follow up plan is as follows: I have counseled this patient on diet and exercise regimens. Decrease carbohydrates (white foods, sweet foods, sweet drinks and alcohol), increase green leafy vegetables and protein (lean meats and beans) with each meal.  Avoid fried foods and heartburn triggers. Eat 3-5 small meals daily. Do not skip meals. Increase water intake. Increase physical activity to 30 minutes daily for health benefit or 60 minutes daily to prevent weight regain, as tolerated. Get 7-8 hours uninterrupted sleep nightly. Chart reviewed and updated. Continue current medications and care. Try OTC Priloesc 20 mg. If not enough relief, resume Prilosec 40 mg qam. Can also use OTC Pepcid or Zantac prn. Prescriptions written and sent to pharmacy; medication side effects discussed. Prilosec 40 mg.   Recheck pertinent labs in 4/2019. Get recent office visit notes from [facility]. Advised pt to sign release. Referrals given; patient urged to keep appointments with specialists. Counseled patient on health concerns:  Heartburn, hypertension, easy bruisability, hyperglycemia, and osteoporosis.    Relevant handouts given and discussed with patient. Immunizations noted. Offered empathy, support, legitimation, prayers, partnership to patient. Praised patient for progress. Follow-up Disposition:  Return in about 3 months (around 4/30/2019) for results, blood pressure, heartburn. Patient was offered a choice/choices in the treatment plan today. Patient expresses understanding of the plan and agrees with recommendations. Written by kathryn Martell, as dictated by Dr. Karen Brink DO. Documentation True and Accepted by Vidhya Rodriguez. Patient Instructions          Indigestion (Dyspepsia or Heartburn): Care Instructions  Your Care Instructions  Sometimes it can be hard to pinpoint the cause of indigestion. (It is also called dyspepsia or heartburn.) Most cases of an upset stomach with bloating, burning, burping, and nausea are minor and go away within several hours. Home treatment and over-the-counter medicine often are able to control symptoms. But if you take medicine to relieve your indigestion without making diet and lifestyle changes, your symptoms are likely to return again and again. If you get indigestion often, it may be a sign of a more serious medical problem. Be sure to follow up with your doctor, who may want to do tests to be sure of the cause of your indigestion. Follow-up care is a key part of your treatment and safety. Be sure to make and go to all appointments, and call your doctor if you are having problems. It's also a good idea to know your test results and keep a list of the medicines you take. How can you care for yourself at home? · Your doctor may recommend over-the-counter medicine. For mild or occasional indigestion, antacids such as Gaviscon, Mylanta, Maalox, or Tums, may help. Be safe with medicines. Be careful when you take over-the-counter antacid medicines. Many of these medicines have aspirin in them.  Read the label to make sure that you are not taking more than the recommended dose. Too much aspirin can be harmful. · Your doctor also may recommend over-the-counter acid reducers, such as Pepcid AC, Tagamet HB, Zantac 75, or Prilosec. Read and follow all instructions on the label. If you use these medicines often, talk with your doctor. · Change your eating habits. ? It's best to eat several small meals instead of two or three large meals. ? After you eat, wait 2 to 3 hours before you lie down. ? Chocolate, mint, and alcohol can make GERD worse. ? Spicy foods, foods that have a lot of acid (like tomatoes and oranges), and coffee can make GERD symptoms worse in some people. If your symptoms are worse after you eat a certain food, you may want to stop eating that food to see if your symptoms get better. · Do not smoke or chew tobacco. Smoking can make GERD worse. If you need help quitting, talk to your doctor about stop-smoking programs and medicines. These can increase your chances of quitting for good. · If you have GERD symptoms at night, raise the head of your bed 6 to 8 inches. You can do this by putting the frame on blocks or placing a foam wedge under the head of your mattress. (Adding extra pillows does not work.)  · Do not wear tight clothing around your middle. · Lose weight if you need to. Losing just 5 to 10 pounds can help. · Do not take anti-inflammatory medicines, such as aspirin, ibuprofen (Advil, Motrin), or naproxen (Aleve). These can irritate the stomach. If you need a pain medicine, try acetaminophen (Tylenol), which does not cause stomach upset. When should you call for help? Call your doctor now or seek immediate medical care if:    · You have new or worse belly pain.     · You are vomiting.    Watch closely for changes in your health, and be sure to contact your doctor if:    · You have new or worse symptoms of indigestion.     · You have trouble or pain swallowing.     · You are losing weight.     · You do not get better as expected.    Where can you learn more?  Go to http://margo-kandi.info/. Enter B227 in the search box to learn more about \"Indigestion (Dyspepsia or Heartburn): Care Instructions. \"  Current as of: March 27, 2018  Content Version: 11.9  © 9563-0961 [employer]. Care instructions adapted under license by [employer] (which disclaims liability or warranty for this information). If you have questions about a medical condition or this instruction, always ask your healthcare professional. [place] [address] any warranty or liability for your use of this information. HEARTBURN occurs when stomach acid moves back up through your esophagus. Several conditions and foods can contribute to this process. Common causes include:    Hiatal Hernia  Pregnancy  Alcohol use  Overweight or Obesity  Smoking. Consuming certain types of foods or drinks can increase the acid content of the stomach and cause heartburn. AVOID THESE TRIGGERS:     Stress  Alcoholic or carbonated beverages  Caffeine (coffee, tea, chocolate, soda)  Acidic foods or drinks (Oranges or orange juice, apples, apple juice, grapefruit or grapefruit juice, bananas, grape juice)  Tomatoes or tomato based foods (pizza, spaghetti, chili)  Greasy, Fatty or Fried foods  Spicy Foods (including hot sauce)  Garlic  Onions  Mint flavoring (peppermint, speramint, cinnamon). PREVENTIVE MEASURES    Do not wear tight, restrictive clothing. Lose weight. Elevate the head of the bed 4 to 6 inches with blocks or a wedge pillow. Wait 2 to 3 hours after a meal before lying down. Avoid the triggers. MEDICATIONS     Try over the counter medications if needed first.  These include:  TUMS, ROLAIDS, Mylanta, Prilosec 20 mg, Pepcid 20 mg, Tagamet, Zantac up to 150 mg twice daily or 300 mg daily, Nexium 20 mg up to 40 mg daily and Prevacid up to 30 mg daily. Ideally, take for 2 weeks after symptoms consistently appear.       NOTIFY OUR OFFICE    If symptoms worsen or persist.   If breathing or swallowing becomes difficult. If you regurgitate blood. DIAL 911 IF THE HEART BURN SYMPTOMS ARE ACCOMPANIED BY   Shortness of breath, sweating, pain in the jaw, neck or arm, cold clammy feeling with nausea or vomiting. This could be a HEART ATTACK. Indigestion (Dyspepsia or Heartburn): Care Instructions  Your Care Instructions  Sometimes it can be hard to pinpoint the cause of indigestion. (It is also called dyspepsia or heartburn.) Most cases of an upset stomach with bloating, burning, burping, and nausea are minor and go away within several hours. Home treatment and over-the-counter medicine often are able to control symptoms. But if you take medicine to relieve your indigestion without making diet and lifestyle changes, your symptoms are likely to return again and again. If you get indigestion often, it may be a sign of a more serious medical problem. Be sure to follow up with your doctor, who may want to do tests to be sure of the cause of your indigestion. Follow-up care is a key part of your treatment and safety. Be sure to make and go to all appointments, and call your doctor if you are having problems. It's also a good idea to know your test results and keep a list of the medicines you take. How can you care for yourself at home? · Your doctor may recommend over-the-counter medicine. For mild or occasional indigestion, antacids such as Gaviscon, Mylanta, Maalox, or Tums, may help. Be safe with medicines. Be careful when you take over-the-counter antacid medicines. Many of these medicines have aspirin in them. Read the label to make sure that you are not taking more than the recommended dose. Too much aspirin can be harmful. · Your doctor also may recommend over-the-counter acid reducers, such as Pepcid AC, Tagamet HB, Zantac 75, or Prilosec. Read and follow all instructions on the label.  If you use these medicines often, talk with your doctor. · Change your eating habits. ? It's best to eat several small meals instead of two or three large meals. ? After you eat, wait 2 to 3 hours before you lie down. ? Chocolate, mint, and alcohol can make GERD worse. ? Spicy foods, foods that have a lot of acid (like tomatoes and oranges), and coffee can make GERD symptoms worse in some people. If your symptoms are worse after you eat a certain food, you may want to stop eating that food to see if your symptoms get better. · Do not smoke or chew tobacco. Smoking can make GERD worse. If you need help quitting, talk to your doctor about stop-smoking programs and medicines. These can increase your chances of quitting for good. · If you have GERD symptoms at night, raise the head of your bed 6 to 8 inches. You can do this by putting the frame on blocks or placing a foam wedge under the head of your mattress. (Adding extra pillows does not work.)  · Do not wear tight clothing around your middle. · Lose weight if you need to. Losing just 5 to 10 pounds can help. · Do not take anti-inflammatory medicines, such as aspirin, ibuprofen (Advil, Motrin), or naproxen (Aleve). These can irritate the stomach. If you need a pain medicine, try acetaminophen (Tylenol), which does not cause stomach upset. When should you call for help? Call your doctor now or seek immediate medical care if:    · You have new or worse belly pain.     · You are vomiting.    Watch closely for changes in your health, and be sure to contact your doctor if:    · You have new or worse symptoms of indigestion.     · You have trouble or pain swallowing.     · You are losing weight.     · You do not get better as expected. Where can you learn more? Go to http://margo-kandi.info/. Enter H883 in the search box to learn more about \"Indigestion (Dyspepsia or Heartburn): Care Instructions. \"  Current as of: March 27, 2018  Content Version: 11.9  © 7860-1397 [employer]. Care instructions adapted under license by [employer] (which disclaims liability or warranty for this information). If you have questions about a medical condition or this instruction, always ask your healthcare professional. [address] any warranty or liability for your use of this information.

## 2019-06-28 DIAGNOSIS — R73.9 HYPERGLYCEMIA: Primary | ICD-10-CM

## 2019-06-28 DIAGNOSIS — R23.3 EASY BRUISABILITY: ICD-10-CM

## 2019-06-28 DIAGNOSIS — I10 ESSENTIAL HYPERTENSION: ICD-10-CM

## 2019-07-01 ENCOUNTER — OFFICE VISIT (OUTPATIENT)
Dept: FAMILY MEDICINE CLINIC | Age: 64
End: 2019-07-01

## 2019-07-01 VITALS
OXYGEN SATURATION: 97 % | DIASTOLIC BLOOD PRESSURE: 76 MMHG | HEART RATE: 77 BPM | SYSTOLIC BLOOD PRESSURE: 126 MMHG | TEMPERATURE: 98.6 F | WEIGHT: 136.7 LBS | HEIGHT: 63 IN | BODY MASS INDEX: 24.22 KG/M2 | RESPIRATION RATE: 18 BRPM

## 2019-07-01 DIAGNOSIS — R00.2 HEART PALPITATIONS: ICD-10-CM

## 2019-07-01 DIAGNOSIS — Z13.31 SCREENING FOR DEPRESSION: ICD-10-CM

## 2019-07-01 DIAGNOSIS — Z12.39 BREAST CANCER SCREENING: ICD-10-CM

## 2019-07-01 DIAGNOSIS — I10 ESSENTIAL HYPERTENSION: ICD-10-CM

## 2019-07-01 DIAGNOSIS — Z85.3 HISTORY OF LEFT BREAST CANCER: ICD-10-CM

## 2019-07-01 DIAGNOSIS — Z00.00 WELCOME TO MEDICARE PREVENTIVE VISIT: Primary | ICD-10-CM

## 2019-07-01 DIAGNOSIS — R73.9 HYPERGLYCEMIA: ICD-10-CM

## 2019-07-01 DIAGNOSIS — R63.4 WEIGHT LOSS: ICD-10-CM

## 2019-07-01 DIAGNOSIS — Z13.39 SCREENING FOR ALCOHOLISM: ICD-10-CM

## 2019-07-01 DIAGNOSIS — E78.00 PURE HYPERCHOLESTEROLEMIA: ICD-10-CM

## 2019-07-01 DIAGNOSIS — F43.21 GRIEF: ICD-10-CM

## 2019-07-01 DIAGNOSIS — M81.0 AGE-RELATED OSTEOPOROSIS WITHOUT CURRENT PATHOLOGICAL FRACTURE: ICD-10-CM

## 2019-07-01 DIAGNOSIS — Z23 ENCOUNTER FOR IMMUNIZATION: ICD-10-CM

## 2019-07-01 DIAGNOSIS — Z11.59 NEED FOR HEPATITIS C SCREENING TEST: ICD-10-CM

## 2019-07-01 NOTE — PATIENT INSTRUCTIONS
[address] For more information or to sign-up, email [email or web address] or call the [facility] office at [phone]. [facility] building is located at [employer] [address], [address] [facility] [address] [facility] [address], [address] PHONE:  [phone] EMAIL:  [email or web address]. [employer] The Grief Recovery Handbook, the action program for moving beyond death, divorce, and other losses by Damaris Marie and Mattie Viveros Medicare Wellness Visit, Female The best way to live healthy is to have a lifestyle where you eat a well-balanced diet, exercise regularly, limit alcohol use, and quit all forms of tobacco/nicotine, if applicable. Regular preventive services are another way to keep healthy. Preventive services (vaccines, screening tests, monitoring & exams) can help personalize your care plan, which helps you manage your own care. Screening tests can find health problems at the earliest stages, when they are easiest to treat. Sanjay Umanzor follows the current, evidence-based guidelines published by the [facility] [employer] [place] (USPSTF) when recommending preventive services for our patients. Because we follow these guidelines, sometimes recommendations change over time as research supports it. (For example, mammograms used to be recommended annually. Even though Medicare will still pay for an annual mammogram, the newer guidelines recommend a mammogram every two years for women of average risk.) Of course, you and your doctor may decide to screen more often for some diseases, based on your risk and your health status. Preventive services for you include: - Medicare offers their members a free annual wellness visit, which is time for you and your primary care provider to discuss and plan for your preventive service needs. Take advantage of this benefit every year! 
-All adults over the age of 72 should receive the recommended pneumonia vaccines. Current USPSTF guidelines recommend a series of two vaccines for the best pneumonia protection.  
-All adults should have a flu vaccine yearly and a tetanus vaccine every 10 years. All adults age 61 and older should receive a shingles vaccine once in their lifetime.   
-A bone mass density test is recommended when a woman turns 65 to screen for osteoporosis. This test is only recommended one time, as a screening. Some providers will use this same test as a disease monitoring tool if you already have osteoporosis. -All adults age 38-68 who are overweight should have a diabetes screening test once every three years.  
-Other screening tests and preventive services for persons with diabetes include: an eye exam to screen for diabetic retinopathy, a kidney function test, a foot exam, and stricter control over your cholesterol.  
-Cardiovascular screening for adults with routine risk involves an electrocardiogram (ECG) at intervals determined by your doctor.  
-Colorectal cancer screenings should be done for adults age 54-65 with no increased risk factors for colorectal cancer. There are a number of acceptable methods of screening for this type of cancer. Each test has its own benefits and drawbacks. Discuss with your doctor what is most appropriate for you during your annual wellness visit. The different tests include: colonoscopy (considered the best screening method), a fecal occult blood test, a fecal DNA test, and sigmoidoscopy.  
-Breast cancer screenings are recommended every other year for women of normal risk, age 54-69. 
-Cervical cancer screenings for women over age 72 are only recommended with certain risk factors.  
-All adults born between [facility] should be screened once for Hepatitis C.  
 
 Here is a list of your current Health Maintenance items (your personalized list of preventive services) with a due date: 
Health Maintenance Due Topic Date Due  
 Hepatitis C Test  1955  Shingles Vaccine (1 of 2) 04/16/2004  DTaP/Tdap/Td  (1 - Tdap) 08/02/2009  Pneumococcal Vaccine (1 of 2 - PCV13) 04/16/2019  Glaucoma Screening   06/28/2019 Learning About Breast Cancer Screening What is breast cancer screening? Breast cancer occurs when cells that are not normal grow in one or both of your breasts. Screening tests can help find breast cancer early. Cancer is easier to treat when it's found early. Having concerns about breast cancer is common. That's why it's important to talk with your doctor about when to start and how often to get screened for breast cancer. How is breast cancer screening done? Several screening tests can be used to check for breast cancer. · Mammograms check for signs of cancer using X-rays. They can show tumors that are too small for you or your doctor to feel. During a mammogram, a machine squeezes your breasts to make them flatter and easier to X-ray. At least two pictures are taken of each breast. One is taken from the top and one from the side. · 3-D mammograms are also called digital breast tomosynthesis. Your breast is positioned on a flat plate. A top plate is pressed against your breast to keep it in position. The X-ray arm then moves in an arc above the breast and takes many pictures. A computer uses these X-rays to create a three-dimensional image. · Clinical breast exams are a doctor's exam. Your doctor carefully feels your breasts and under your arms to check for lumps or other changes. After the screening, your doctor will tell you the results. You will also be told if you need any follow-up tests. When should you get screened?  
Talk with your doctor about when you should start being tested for breast cancer. How often you get tested and the kind of tests you get will depend on your age and your risk. The guidelines that follow are for women who have an average risk for breast cancer. If you have a higher risk for breast cancer, such as having a family history of breast cancer in multiple relatives or at a young age, your doctor may recommend different screening for you. · Ages 21 to 44: Some experts recommend that women have a clinical breast exam every 3 years, starting at age 21. Ask your doctor how often you should have this test. If you have a high risk for breast cancer, talk with your doctor about when to start yearly mammograms and other screening tests. · Ages 36 and older: Talk with your doctor about how often you should have mammograms and clinical breast exams. What is your risk for breast cancer? If you don't already know your risk of breast cancer, you can ask your doctor about it. You can also look it up at www.cancer.gov/bcrisktool/. If your doctor says that you have a high or very high risk, ask about ways to reduce your risk. These could include getting extra screening, taking medicine, or having surgery. If you have a strong family history of breast cancer, ask your doctor about genetic testing. What steps can you take to stay healthy? Some things that increase your risk of breast cancer, such as your age and being female, cannot be controlled. But you can do some things to stay as healthy as you can. · Learn what your breasts normally look and feel like. If you notice any changes, tell your doctor. · Drink alcohol wisely. Your risk goes up the more you drink. For the best health, women should have no more than 1 drink a day or 7 drinks a week. · If you smoke, quit. When you quit smoking, you lower your chances of getting many types of cancer. You can also do your best to eat well, be active, and stay at a healthy weight.  Eating healthy foods and being active every day, as well as staying at a healthy weight, may help prevent cancer. Where can you learn more? Go to http://margo-kandi.info/. Enter S880 in the search box to learn more about \"Learning About Breast Cancer Screening. \" Current as of: March 28, 2018 Content Version: 11.8 © 4044-5341 [employer]. Care instructions adapted under license by [employer] (which disclaims liability or warranty for this information). If you have questions about a medical condition or this instruction, always ask your healthcare professional. [address] any warranty or liability for your use of this information. Osteoporosis: Care Instructions Your Care Instructions Osteoporosis causes bones to become thin and weak. It is much more common in women than in men. Osteoporosis may be very advanced before you know you have it. Sometimes the first sign is a broken bone in the hip, spine, or wrist or sudden pain in your middle or lower back. Follow-up care is a key part of your treatment and safety. Be sure to make and go to all appointments, and call your doctor if you are having problems. It's also a good idea to know your test results and keep a list of the medicines you take. How can you care for yourself at home? · Your doctor may prescribe a bisphosphonate, such as risedronate (Actonel) or alendronate (Fosamax), for osteoporosis. If you are taking one of these medicines by mouth: 
? Take your medicine with a full glass of water when you first get up in the morning. ? Do not lie down, eat, drink a beverage, or take any other medicine for at least 30 minutes after taking the drug. This helps prevent stomach problems. ? Do not take your medicine late in the day if you forgot to take it in the morning. Skip it, and take the usual dose the next morning. ? If you have side effects, tell your doctor. He or she may prescribe another medicine. · Get enough calcium and vitamin D. The [place] of Medicine recommends adults younger than age 46 need 1,000 mg of calcium and 600 IU of vitamin D each day. Women ages 46 to 79 need 1,200 mg of calcium and 600 IU of vitamin D each day. Men ages 46 to 79 need 1,000 mg of calcium and 600 IU of vitamin D each day. Adults 71 and older need 1,200 mg of calcium and 800 IU of vitamin D each day. ? Eat foods rich in calcium, like yogurt, cheese, milk, and dark green vegetables. This is a good way to get the calcium you need. You can get vitamin D from eggs, fatty fish, cereal, and milk. ? Talk to your doctor about taking a calcium plus vitamin D supplement. Be careful, though. Adults ages 23 to 48 should not get more than 2,500 mg of calcium and 4,000 IU of vitamin D each day, whether it is from supplements and/or food. Adults ages 46 and older should not get more than 2,000 mg of calcium and 4,000 IU of vitamin D each day from supplements and/or food. · Limit alcohol to 2 drinks a day for men and 1 drink a day for women. Too much alcohol can cause health problems. · Do not smoke. Smoking puts you at a much higher risk for osteoporosis. If you need help quitting, talk to your doctor about stop-smoking programs and medicines. These can increase your chances of quitting for good. · Get regular bone-building exercise. Weight-bearing and resistance exercises keep bones healthy by working the muscles and bones against gravity. Start out at an exercise level that feels right for you. Add a little at a time until you can do the following: ? Do 30 minutes of weight-bearing exercise on most days of the week. Walking, jogging, stair climbing, and dancing are good choices. ? Do resistance exercises with weights or elastic bands 2 to 3 days a week. · Reduce your risk of falls: 
? Wear supportive shoes with low heels and nonslip soles. ? Use a cane or walker, if you need it.  Use shower chairs and bath benches. Put in handrails on stairways, around your shower or tub area, and near the toilet. ? Keep stairs, porches, and walkways well lit. Use night-lights. ? Remove throw rugs and other objects that are in the way. ? Avoid icy, wet, or slippery surfaces. ? Keep a cordless phone and a flashlight with new batteries by your bed. When should you call for help? Watch closely for changes in your health, and be sure to contact your doctor if you have any problems. Where can you learn more? Go to http://margo-kandi.info/. Enter K100 in the search box to learn more about \"Osteoporosis: Care Instructions. \" Current as of: March 15, 2018 Content Version: 11.9 © 3766-4180 [employer]. Care instructions adapted under license by "[employer] (which disclaims liability or warranty for this information). If you have questions about a medical condition or this instruction, always ask your healthcare professional. [place] [address] any warranty or liability for your use of this information. [address] What is a living will? A living will is a legal form you use to write down the kind of care you want at the end of your life. It is used by the health professionals who will treat you if you aren't able to decide for yourself. If you put your wishes in writing, your loved ones and others will know what kind of care you want. They won't need to guess. This can ease your mind and be helpful to others. A living will is not the same as an estate or property will. An estate will explains what you want to happen with your money and property after you die. Is a living will a legal document? A living will is a legal document. Each state has its own laws about living garcia. If you move to another state, make sure that your living will is legal in the state where you now live.  Or you might use a universal form that has been approved by many states. This kind of form can sometimes be completed and stored online. Your electronic copy will then be available wherever you have a connection to the Internet. In most cases, doctors will respect your wishes even if you have a form from a different state. · You don't need an  to complete a living will. But legal advice can be helpful if your state's laws are unclear, your health history is complicated, or your family can't agree on what should be in your living will. · You can change your living will at any time. Some people find that their wishes about end-of-life care change as their health changes. · In addition to making a living will, think about completing a medical power of  form. This form lets you name the person you want to make end-of-life treatment decisions for you (your \"health care agent\") if you're not able to. Many hospitals and nursing homes will give you the forms you need to complete a living will and a medical power of . · Your living will is used only if you can't make or communicate decisions for yourself anymore. If you become able to make decisions again, you can accept or refuse any treatment, no matter what you wrote in your living will. · Your state may offer an online registry. This is a place where you can store your living will online so the doctors and nurses who need to treat you can find it right away. What should you think about when creating a living will? Talk about your end-of-life wishes with your family members and your doctor. Let them know what you want. That way the people making decisions for you won't be surprised by your choices. Think about these questions as you make your living will: · Do you know enough about life support methods that might be used?  If not, talk to your doctor so you know what might be done if you can't breathe on your own, your heart stops, or you're unable to swallow. · What things would you still want to be able to do after you receive life-support methods? Would you want to be able to walk? To speak? To eat on your own? To live without the help of machines? · If you have a choice, where do you want to be cared for? In your home? At a hospital or nursing home? · Do you want certain Sikhism practices performed if you become very ill? · If you have a choice at the end of your life, where would you prefer to die? At home? In a hospital or nursing home? Somewhere else? · Would you prefer to be buried or cremated? · Do you want your organs to be donated after you die? What should you do with your living will? · Make sure that your family members and your health care agent have copies of your living will. · Give your doctor a copy of your living will to keep in your medical record. If you have more than one doctor, make sure that each one has a copy. · You may want to put a copy of your living will where it can be easily found. Where can you learn more? Go to http://margo-kandi.info/. Enter M540 in the search box to learn more about \"Learning About Living Perroy. \" Current as of: August 8, 2016 Content Version: 11.3 © 9897-8455 [employer]. Care instructions adapted under license by [employer] (which disclaims liability or warranty for this information). If you have questions about a medical condition or this instruction, always ask your healthcare professional. [place] [address] any warranty or liability for your use of this information.

## 2019-07-01 NOTE — PROGRESS NOTES
This is a \"Welcome to [employer]"  Initial Preventive Physical Examination (IPPE) providing Personalized Prevention Plan Services (Performed in the first 12 months of enrollment)    I have reviewed the patient's medical history in detail and updated the computerized patient record. History     Past Medical History:   Diagnosis Date    Breast cancer, stage 1 ([address] 2003    Ductal Carcinoma In Situ. Radiation and surgery. Dr. Farooq Led. Dr. Nicholaus Cranker.  CAD (coronary artery disease)     Chest pain 07/2007    Negative Cardiolite Stress Test.  Dr. Samara Dennison Essential hypertension, benign 07/26/07    Dr. Samara Dennison Fracture of foot 4/30/15    5th metatarsal fracture left foot. Dr. Felicita Patel. [employer].  Heart palpitations 07/2007    Dr. Yamile Jereztler disease     Dr. Em Mendez Metatarsal fracture 12/2006    Right 5th.  Osteoporosis 08/20/08    Dr. Barney Klinefelter S/P radiation therapy 2003    Stress fracture of lower leg 06/11/08    Right distal stress reaction. Past Surgical History:   Procedure Laterality Date    HX BREAST LUMPECTOMY  2003    left due to cancer with radiation    HX COLONOSCOPY  06/24/2010    Dr. Kate Nieves. due q 10 yrs.  HX POLYPECTOMY  04/2008    uterine  Dr. Patrisha Peabody  childhood    Alicia Neville     Current Outpatient Medications   Medication Sig Dispense Refill    dilTIAZem (TIAZAC) 360 mg SR capsule TAKE 1 CAP BY MOUTH DAILY. INDICATIONS: HYPERTENSION, HEART PALPITATIONS 90 Cap 3    loratadine (CLARITIN) 10 mg tablet Take 10 mg by mouth.  triamcinolone (NASACORT AQ) 55 mcg nasal inhaler 2 Sprays daily.  calcium 500 mg tab Take  by mouth. Unsure of exact dose.  multivitamin (ONE A DAY) tablet Take 1 Tab by mouth daily.        Allergies   Allergen Reactions    Percocet [Oxycodone-Acetaminophen] Nausea and Vomiting     dizziness    Prednisone Nausea and Vomiting     \"withdrawals\" and insomnia    Azithromycin Nausea and Vomiting     Family History   Problem Relation Age of Onset    Heart Attack Mother 72    Heart Disease Mother         CAD    High Cholesterol Mother     Other Mother         advanced Meniere's Disease    Cancer Mother 80        LUNG    Diabetes Father     Other Father 80        SEPSIS AFTER TKR (22 YRS AGO)    High Cholesterol Sister     Seizures Sister     Other Sister         MACULAR DEGENERATION    Breast Cancer Paternal Grandmother         pt was in her [de-identified]     Social History     Tobacco Use    Smoking status: Former Smoker     Types: Cigarettes     Last attempt to quit: 1972     Years since quittin.5    Smokeless tobacco: Never Used    Tobacco comment: smoked x6 mos at 21 y/o   Substance Use Topics    Alcohol use: No     Diet, Lifestyle: The patient is prescribed and follows a special diet. Low salt diet. Exercise level: moderately active    Depression Risk Screen     3 most recent PHQ Screens 2019   Little interest or pleasure in doing things Not at all   Feeling down, depressed, irritable, or hopeless Not at all   Total Score PHQ 2 0     Alcohol Risk Screen   On any occasion in the past three months you have had more than 3 drinks containing alcohol. Functional Ability and Level of Safety   Pt ambulates safely without assistance. Hearing Loss  The patient needs further evaluation. Pt has seen ENT for L hearing loss. Unchanged. Pt denies new referral today. Vision Screening  Vision is good. No exam data present      Activities of Daily Living  The home contains: handrails and grab bars  Patient does total self care    Fall Risk Screen  Fall Risk Assessment, last 12 mths 2019   Able to walk? Yes   Fall in past 12 months?  No       Abuse Screen  Patient is not abused    Screening EKG   EKG order placed: Yes    Patient Care Team   Patient Care Team:  Leora Chamberlain DO as PCP - General  Monse Milton MD (Obstetrics & Gynecology)  Alina Cardenas MD (Endocrinology)  Oren Montano MD (Cardiology)  Marisa Villasenor MD (Breast Surgery)  Jenniffer Deras OD (Optometry)     End of Life Planning   Advanced care planning directives were discussed with the patient and /or family/caregiver. Assessment/Plan   Education and counseling provided:  Are appropriate based on today's review and evaluation  End-of-Life planning (with patient's consent)  Pneumococcal Vaccine  Influenza Vaccine  Screening Mammography  Screening Pap and pelvic (covered once every 2 years)  Colorectal cancer screening tests  Cardiovascular screening blood test  Bone mass measurement (DEXA)  Screening for glaucoma  Diabetes screening test    Diagnoses and all orders for this visit:    1. Welcome to Medicare preventive visit  -     AMB POC EKG ROUTINE W/ 12 LEADS, SCREEN ()    2. Essential hypertension  Comments:  stable on meds    3. Hyperglycemia    4. Pure hypercholesterolemia    5. Heart palpitations  Comments:  stable on Diltiazem    6. Grief  Comments:  due to dad's unexpected death 03/06/19, stable    7. History of left breast cancer  -     ALEXANDRA 3D RODRI W MAMMO BI SCREENING INCL CAD; Future    8. Age-related osteoporosis without current pathological fracture    9. Weight loss  Comments:  7# since 01/2019 due to increased yardwork/exercise and \"summer weight\"    10. Need for hepatitis C screening test  -     HEPATITIS C AB    11. Breast cancer screening  -     ALEXANDRA 3D RODRI W MAMMO BI SCREENING INCL CAD; Future    12. Screening for alcoholism  -     MT ANNUAL ALCOHOL SCREEN 15 MIN    13. Screening for depression  -     [facility]    14.  Encounter for immunization  -     TETANUS, DIPHTHERIA TOXOIDS AND ACELLULAR PERTUSSIS VACCINE (TDAP), IN INDIVIDS. >=7, IM        Health Maintenance Due   Topic Date Due    Hepatitis C Screening  1955    Shingrix Vaccine Age 50> (1 of 2) 04/16/2004    DTaP/Tdap/Td series (1 - Tdap) 08/02/2009    Pneumococcal 65+ years (1 of 2 - PCV13) 04/16/2019    GLAUCOMA SCREENING Q2Y  06/28/2019

## 2019-07-01 NOTE — PROGRESS NOTES
HISTORY OF PRESENT ILLNESS  Joss Voss is a 71 y.o. female presents with Blood Pressure Check (Room 14); Labs; Referral Follow Up; Stress; and Annual Wellness Visit    Agree with nurse note. Pt with HTN, hyperglycemia, heart palpitations, hypercholesterolemia, hx of L breast ca, and need for hep c screening presents to the office with a BP of 126/76. Patient denies vision changes, headaches, dizziness, chest pain, SOB, or swelling. For BP, she uses Diltiazem 360 mg daily, tolerating well. Pt has lost 7 lbs since 1/31/2019. She attributes this to being more active in the summer months. Pt's dad recently passed away on 3/6/2019. She admits feelings of sadness. No SI/HI. Health Maintenance    Initial Medicare Wellness Visit Performed today. Pt's most recent colonoscopy was on 6/24/2010 with Dr. Charli Lehman. F/U 10 years. She has an upcoming eye exam in 7/2019 with Dr. Radha Anderson. Pt's most recent mammogram was on 8/27/2018 with Dr. Lulu Diaz. DEXA scan is being ordered by Dr. Ronit Francis. Pt has age-related osteoporosis. Written by Trudie Lennox, scribe, as dictated by Dr. Ravin Flor DO.    ROS    Review of Systems negative except as noted above in HPI. ALLERGIES:    Allergies   Allergen Reactions    Percocet [Oxycodone-Acetaminophen] Nausea and Vomiting     dizziness    Prednisone Nausea and Vomiting     \"withdrawals\" and insomnia    Azithromycin Nausea and Vomiting       CURRENT MEDICATIONS:    Outpatient Medications Marked as Taking for the 7/1/19 encounter (Office Visit) with Carla Belcher DO   Medication Sig Dispense Refill    dilTIAZem (TIAZAC) 360 mg SR capsule TAKE 1 CAP BY MOUTH DAILY. INDICATIONS: HYPERTENSION, HEART PALPITATIONS 90 Cap 3    loratadine (CLARITIN) 10 mg tablet Take 10 mg by mouth.  triamcinolone (NASACORT AQ) 55 mcg nasal inhaler 2 Sprays daily.  calcium 500 mg tab Take  by mouth. Unsure of exact dose.       multivitamin (ONE A DAY) tablet Take 1 Tab by mouth daily. PAST MEDICAL HISTORY:    Past Medical History:   Diagnosis Date    Breast cancer, stage 1 ([address]     Ductal Carcinoma In Situ. Radiation and surgery. Dr. Essence Delgado. Dr. Jaspreet So.  CAD (coronary artery disease)     Chest pain 2007    Negative Cardiolite Stress Test.  Dr. Norma Tabor Essential hypertension, benign 07    Dr. Norma Tabor Fracture of foot 4/30/15    5th metatarsal fracture left foot. Dr. Bo Cardona. Workers Comp.  Heart palpitations 2007    Dr. Spear Moll disease     Dr. Marcela Bates Metatarsal fracture 2006    Right 5th.  Osteoporosis 08    Dr. Gustavo Adamson S/P radiation therapy     Stress fracture of lower leg 08    Right distal stress reaction. PAST SURGICAL HISTORY:    Past Surgical History:   Procedure Laterality Date    HX BREAST LUMPECTOMY  2003    left due to cancer with radiation    HX COLONOSCOPY  2010    Dr. Emelia Smith. due q 10 yrs.     HX POLYPECTOMY  2008    uterine  Dr. Gonzalez Gilliam  childhood    Brerheata Long[place]davion [place]       FAMILY HISTORY:    Family History   Problem Relation Age of Onset    Heart Attack Mother 72    Heart Disease Mother         CAD    High Cholesterol Mother     Other Mother         advanced Meniere's Disease    Cancer Mother 80        LUNG    Diabetes Father     Other Father 80        SEPSIS AFTER TKR (22 YRS AGO)    High Cholesterol Sister     Seizures Sister     Other Sister         MACULAR DEGENERATION    Breast Cancer Paternal Grandmother         pt was in her [de-identified]       SOCIAL HISTORY:    Social History     Socioeconomic History    Marital status:      Spouse name: Not on file    Number of children: Not on file    Years of education: Not on file    Highest education level: Not on file   Tobacco Use    Smoking status: Former Smoker     Types: Cigarettes     Last attempt to quit: 1972     Years since quittin.5    Smokeless tobacco: Never Used    Tobacco comment: smoked x6 mos at 23 y/o   Substance and Sexual Activity    Alcohol use: No    Drug use: No    Sexual activity: Yes     Partners: Male     Birth control/protection: Surgical     Comment: TL       IMMUNIZATIONS:    Immunization History   Administered Date(s) Administered    H1N1 Influenza Virus Vaccine 2009    Influenza Vaccine 10/13/2015, 2016, 2018    Influenza Vaccine (Quad) Mdck Pf 2018    Influenza Vaccine (Quad) PF 2017    Influenza Vaccine Split 2010, 2011    Influenza Vaccine Whole 2009    Pneumococcal Polysaccharide (PPSV-23) 2017    TD Vaccine 2009    Tdap 2019    Zoster Recombinant 2019         PHYSICAL EXAMINATION    Vital Signs    Visit Vitals  /76 (BP 1 Location: Left arm, BP Patient Position: Sitting)   Pulse 77   Temp 98.6 °F (37 °C) (Oral)   Resp 18   Ht 5' 3\" (1.6 m)   Wt 136 lb 11.2 oz (62 kg)   SpO2 97%   BMI 24.22 kg/m²       Weight Metrics 2019 2019 10/9/2018 2018 2018 2017 3/17/2017   Weight 136 lb 11.2 oz 143 lb 12.8 oz 143 lb 4.8 oz 137 lb 14.4 oz 139 lb 9.6 oz 143 lb 1.6 oz 140 lb 14.4 oz   BMI 24.22 kg/m2 26.3 kg/m2 25.68 kg/m2 24.04 kg/m2 24.34 kg/m2 24.95 kg/m2 24.56 kg/m2       General appearance - Well nourished. Well appearing. Well developed. No acute distress. Head - Normocephalic. Atraumatic. Eyes - pupils equal and reactive. Extraocular eye movements intact. Sclera anicteric. Mildly injected sclera. Ears - Hearing is grossly normal bilaterally. Nose - normal and patent. No polyps noted. No erythema. No discharge. Mouth - mucous membranes with adequate moisture. Posterior pharynx normal with cobblestone appearance. No erythema, white exudate or obstruction. Neck - supple. Midline trachea.   No carotid bruits noted bilaterally. No thyromegaly noted. Chest - clear to auscultation bilaterally anteriorly and posteriorly. No wheezes. No rales or rhonchi. Breath sounds are symmetrical bilaterally. Unlabored respirations. Heart - normal rate. Regular rhythm. Normal S1, S2. No murmur noted. No rubs, clicks or gallops noted. Abdomen - soft and distended. No masses or organomegaly. No rebound, rigidity or guarding. Bowel sounds normal x 4 quadrants. No tenderness noted. Neurological - awake, alert and oriented to person, place, and time and event. Cranial nerves II through XII intact. Clear speech. Muscle strength is +5/5 x 4 extremities. Sensation is intact to light touch bilaterally. Steady gait. Heme/Lymph - peripheral pulses normal x 4 extremities. No peripheral edema is noted. Musculoskeletal - Intact x 4 extremities. Full ROM x 4 extremities. No pain with movement. Back exam - normal range of motion. No pain on palpation of the spinous processes in the cervical, thoracic, lumbar, sacral regions. No CVA tenderness. Increased kyphosis   Skin - no rashes, erythema, ecchymosis, lacerations, abrasions, suspicious moles noted  Psychological -   normal behavior, dress and thought processes. Good insight. Good eye contact. Normal affect. Appropriate mood. Normal speech.       DATA REVIEWED    Lab Results   Component Value Date/Time    WBC 5.1 07/29/2014 09:33 AM    HGB 12.5 07/29/2014 09:33 AM    HCT 38.6 07/29/2014 09:33 AM    PLATELET 251 63/45/2404 09:33 AM    MCV 89 07/29/2014 09:33 AM     Lab Results   Component Value Date/Time    Sodium 143 09/25/2017 08:21 AM    Potassium 4.3 09/25/2017 08:21 AM    Chloride 103 09/25/2017 08:21 AM    CO2 27 09/25/2017 08:21 AM    Glucose 86 09/25/2017 08:21 AM    BUN 14 09/25/2017 08:21 AM    Creatinine 0.74 09/25/2017 08:21 AM    BUN/Creatinine ratio 19 09/25/2017 08:21 AM    GFR est  09/25/2017 08:21 AM    GFR est non-AA 86 09/25/2017 08:21 AM    Calcium 9.3 09/25/2017 08:21 AM    Bilirubin, total 0.3 09/25/2017 08:21 AM    AST (SGOT) 17 09/25/2017 08:21 AM    Alk. phosphatase 84 09/25/2017 08:21 AM    Protein, total 6.5 09/25/2017 08:21 AM    Albumin 4.3 09/25/2017 08:21 AM    A-G Ratio 2.0 09/25/2017 08:21 AM    ALT (SGPT) 14 09/25/2017 08:21 AM     Lab Results   Component Value Date/Time    Cholesterol, total 222 (H) 09/25/2017 08:21 AM    HDL Cholesterol 89 09/25/2017 08:21 AM    LDL, calculated 116 (H) 09/25/2017 08:21 AM    VLDL, calculated 17 09/25/2017 08:21 AM    Triglyceride 87 09/25/2017 08:21 AM     Lab Results   Component Value Date/Time    Vitamin D 25-Hydroxy 37.1 06/20/2011 08:19 AM    VITAMIN D, 25-HYDROXY 42.7 07/29/2014 09:33 AM       Lab Results   Component Value Date/Time    Hemoglobin A1c 5.7 (H) 09/25/2017 08:21 AM     Lab Results   Component Value Date/Time    TSH 1.700 09/25/2017 08:21 AM       ASSESSMENT and PLAN      ICD-10-CM ICD-9-CM    1. Welcome to Medicare preventive visit Z00.00 V70.0 AMB POC EKG ROUTINE W/ 12 LEADS, SCREEN ()   2. Essential hypertension I10 401.9     stable on meds   3. Hyperglycemia R73.9 790.29    4. Pure hypercholesterolemia E78.00 272.0    5. Heart palpitations R00.2 785.1     stable on Diltiazem   6. Grief F43.21 309.0     due to dad's unexpected death 03/06/19, stable   7. History of left breast cancer Z85.3 V10.3 ALEXANDRA 3D RODRI W MAMMO BI SCREENING INCL CAD   8. Age-related osteoporosis without current pathological fracture M81.0 733.01    9. Weight loss R63.4 783.21     7# since 01/2019 due to increased yardwork/exercise and \"summer weight\"   10. Need for hepatitis C screening test Z11.59 V73.89 HEPATITIS C AB   11. Breast cancer screening Z12.31 V76.10 ALEXANDRA 3D RODRI W MAMMO BI SCREENING INCL CAD   15. Screening for alcoholism Z13.39 V79.1 AK ANNUAL ALCOHOL SCREEN 15 MIN   13. Screening for depression Z13.31 V79.0 Tarik Cruz   14.  Encounter for immunization Z23 V03.89 TETANUS, DIPHTHERIA TOXOIDS AND ACELLULAR PERTUSSIS VACCINE (TDAP), IN INDIVIDS. >=7, IM       Discussed the patient's BMI with her. The BMI follow up plan is as follows: I have counseled this patient on diet and exercise regimens  Decrease carbohydrates (white foods, sweet foods, sweet drinks and alcohol), increase green leafy vegetables and protein (lean meats and beans) with each meal.  Avoid fried foods. Eat 3-5 small meals daily. Do not skip meals. Increase water intake. Increase physical activity to 30 minutes daily for health benefit or 60 minutes daily to prevent weight regain, as tolerated. Get 7-8 hours uninterrupted sleep nightly. Chart reviewed and updated. Continue current medications and care. EKG performed today. Interpreted and reviewed with pt. NSR at 72 bpm.  Recheck pertinent labs today. Get recent immunization records [facility]. Advised pt to sign release. Referrals given; patient urged to keep appointments with specialists. Mammo  Counseled patient on health concerns:  Blood pressure, weight loss, diminished hearing. Grief. Relevant handouts given and discussed with patient. Immunizations noted; Prevnar 13 and tdap given today. Advised pt to discuss Shingrix vaccine with insurance company and local pharmacy. Offered empathy, support, legitimation, prayers, partnership to patient. Praised patient for progress. ACP handout given today. Declines honoring choices referral.   Follow-up and Dispositions    · Return in about 6 months (around 1/1/2020) for blood pressure, referral follow up, results. Patient was offered a choice/choices in the treatment plan today. Patient expresses understanding of the plan and agrees with recommendations. More than 30 mins spent face to face with patient and more than 50% of this time spent in counseling and coordinating care. Written by kathryn Merlos, as dictated by Dr. Tomas Mays DO.     Documentation True and Accepted by Nathan Putnam. Connor Jackson. Patient Instructions     [address]  For more information or to sign-up, email [email or web address] or call the Protestant office at [phone]. [facility] building is located at [employer] [place], [address] [place] [address]. [facility] COUNSELING and [facility] [address]  [address]  [place], [address] [facility]  PHONE:  [phone]               EMAIL:  [email or web address]    The Grief Recovery Handbook, the action program for moving beyond death, divorce, and other losses by Miki Curran and Vane     Medicare Wellness Visit, Female     The best way to live healthy is to have a lifestyle where you eat a well-balanced diet, exercise regularly, limit alcohol use, and quit all forms of tobacco/nicotine, if applicable. Regular preventive services are another way to keep healthy. Preventive services (vaccines, screening tests, monitoring & exams) can help personalize your care plan, which helps you manage your own care. Screening tests can find health problems at the earliest stages, when they are easiest to treat. [facility] follows the current, evidence-based guidelines published by the [place] [employer] (USPSTF) when recommending preventive services for our patients. Because we follow these guidelines, sometimes recommendations change over time as research supports it. (For example, mammograms used to be recommended annually. Even though Medicare will still pay for an annual mammogram, the newer guidelines recommend a mammogram every two years for women of average risk.)  Of course, you and your doctor may decide to screen more often for some diseases, based on your risk and your health status.    Preventive services for you include:  - Medicare offers their members a free annual wellness visit, which is time for you and your primary care provider to discuss and plan for your preventive service needs. Take advantage of this benefit every year!  -All adults over the age of 72 should receive the recommended pneumonia vaccines. Current USPSTF guidelines recommend a series of two vaccines for the best pneumonia protection.   -All adults should have a flu vaccine yearly and a tetanus vaccine every 10 years. All adults age 61 and older should receive a shingles vaccine once in their lifetime.    -A bone mass density test is recommended when a woman turns 65 to screen for osteoporosis. This test is only recommended one time, as a screening. Some providers will use this same test as a disease monitoring tool if you already have osteoporosis. -All adults age 38-68 who are overweight should have a diabetes screening test once every three years.   -Other screening tests and preventive services for persons with diabetes include: an eye exam to screen for diabetic retinopathy, a kidney function test, a foot exam, and stricter control over your cholesterol.   -Cardiovascular screening for adults with routine risk involves an electrocardiogram (ECG) at intervals determined by your doctor.   -Colorectal cancer screenings should be done for adults age 54-65 with no increased risk factors for colorectal cancer. There are a number of acceptable methods of screening for this type of cancer. Each test has its own benefits and drawbacks. Discuss with your doctor what is most appropriate for you during your annual wellness visit. The different tests include: colonoscopy (considered the best screening method), a fecal occult blood test, a fecal DNA test, and sigmoidoscopy. -Breast cancer screenings are recommended every other year for women of normal risk, age 54-69.  -Cervical cancer screenings for women over age 72 are only recommended with certain risk factors.   -All adults born between [facility] should be screened once for Hepatitis C.      Here is a list of your current Health Maintenance items (your personalized list of preventive services) with a due date:  Health Maintenance Due   Topic Date Due    Hepatitis C Test  1955    Shingles Vaccine (1 of 2) 04/16/2004    DTaP/Tdap/Td  (1 - Tdap) 08/02/2009    Pneumococcal Vaccine (1 of 2 - PCV13) 04/16/2019    Glaucoma Screening   06/28/2019              Learning About Breast Cancer Screening  What is breast cancer screening? Breast cancer occurs when cells that are not normal grow in one or both of your breasts. Screening tests can help find breast cancer early. Cancer is easier to treat when it's found early. Having concerns about breast cancer is common. That's why it's important to talk with your doctor about when to start and how often to get screened for breast cancer. How is breast cancer screening done? Several screening tests can be used to check for breast cancer. · Mammograms check for signs of cancer using X-rays. They can show tumors that are too small for you or your doctor to feel. During a mammogram, a machine squeezes your breasts to make them flatter and easier to X-ray. At least two pictures are taken of each breast. One is taken from the top and one from the side. · 3-D mammograms are also called digital breast tomosynthesis. Your breast is positioned on a flat plate. A top plate is pressed against your breast to keep it in position. The X-ray arm then moves in an arc above the breast and takes many pictures. A computer uses these X-rays to create a three-dimensional image. · Clinical breast exams are a doctor's exam. Your doctor carefully feels your breasts and under your arms to check for lumps or other changes. After the screening, your doctor will tell you the results. You will also be told if you need any follow-up tests. When should you get screened? Talk with your doctor about when you should start being tested for breast cancer.  How often you get tested and the kind of tests you get will depend on your age and your risk. The guidelines that follow are for women who have an average risk for breast cancer. If you have a higher risk for breast cancer, such as having a family history of breast cancer in multiple relatives or at a young age, your doctor may recommend different screening for you. · Ages 21 to 44: Some experts recommend that women have a clinical breast exam every 3 years, starting at age 21. Ask your doctor how often you should have this test. If you have a high risk for breast cancer, talk with your doctor about when to start yearly mammograms and other screening tests. · Ages 36 and older: Talk with your doctor about how often you should have mammograms and clinical breast exams. What is your risk for breast cancer? If you don't already know your risk of breast cancer, you can ask your doctor about it. You can also look it up at www.cancer.gov/bcrisktool/. If your doctor says that you have a high or very high risk, ask about ways to reduce your risk. These could include getting extra screening, taking medicine, or having surgery. If you have a strong family history of breast cancer, ask your doctor about genetic testing. What steps can you take to stay healthy? Some things that increase your risk of breast cancer, such as your age and being female, cannot be controlled. But you can do some things to stay as healthy as you can. · Learn what your breasts normally look and feel like. If you notice any changes, tell your doctor. · Drink alcohol wisely. Your risk goes up the more you drink. For the best health, women should have no more than 1 drink a day or 7 drinks a week. · If you smoke, quit. When you quit smoking, you lower your chances of getting many types of cancer. You can also do your best to eat well, be active, and stay at a healthy weight. Eating healthy foods and being active every day, as well as staying at a healthy weight, may help prevent cancer.   Where can you learn more? Go to http://margo-kandi.info/. Enter L032 in the search box to learn more about \"Learning About Breast Cancer Screening. \"  Current as of: March 28, 2018  Content Version: 11.8  © 0373-6633 [employer]. Care instructions adapted under license by [employer] (which disclaims liability or warranty for this information). If you have questions about a medical condition or this instruction, always ask your healthcare professional. [facility] any warranty or liability for your use of this information. Osteoporosis: Care Instructions  Your Care Instructions    Osteoporosis causes bones to become thin and weak. It is much more common in women than in men. Osteoporosis may be very advanced before you know you have it. Sometimes the first sign is a broken bone in the hip, spine, or wrist or sudden pain in your middle or lower back. Follow-up care is a key part of your treatment and safety. Be sure to make and go to all appointments, and call your doctor if you are having problems. It's also a good idea to know your test results and keep a list of the medicines you take. How can you care for yourself at home? · Your doctor may prescribe a bisphosphonate, such as risedronate (Actonel) or alendronate (Fosamax), for osteoporosis. If you are taking one of these medicines by mouth:  ? Take your medicine with a full glass of water when you first get up in the morning. ? Do not lie down, eat, drink a beverage, or take any other medicine for at least 30 minutes after taking the drug. This helps prevent stomach problems. ? Do not take your medicine late in the day if you forgot to take it in the morning. Skip it, and take the usual dose the next morning. ? If you have side effects, tell your doctor. He or she may prescribe another medicine. · Get enough calcium and vitamin D.  The [place] of Medicine recommends adults younger than age 46 need 1,000 mg of calcium and 600 IU of vitamin D each day. Women ages 46 to 79 need 1,200 mg of calcium and 600 IU of vitamin D each day. Men ages 46 to 79 need 1,000 mg of calcium and 600 IU of vitamin D each day. Adults 71 and older need 1,200 mg of calcium and 800 IU of vitamin D each day. ? Eat foods rich in calcium, like yogurt, cheese, milk, and dark green vegetables. This is a good way to get the calcium you need. You can get vitamin D from eggs, fatty fish, cereal, and milk. ? Talk to your doctor about taking a calcium plus vitamin D supplement. Be careful, though. Adults ages 23 to 48 should not get more than 2,500 mg of calcium and 4,000 IU of vitamin D each day, whether it is from supplements and/or food. Adults ages 46 and older should not get more than 2,000 mg of calcium and 4,000 IU of vitamin D each day from supplements and/or food. · Limit alcohol to 2 drinks a day for men and 1 drink a day for women. Too much alcohol can cause health problems. · Do not smoke. Smoking puts you at a much higher risk for osteoporosis. If you need help quitting, talk to your doctor about stop-smoking programs and medicines. These can increase your chances of quitting for good. · Get regular bone-building exercise. Weight-bearing and resistance exercises keep bones healthy by working the muscles and bones against gravity. Start out at an exercise level that feels right for you. Add a little at a time until you can do the following:  ? Do 30 minutes of weight-bearing exercise on most days of the week. Walking, jogging, stair climbing, and dancing are good choices. ? Do resistance exercises with weights or elastic bands 2 to 3 days a week. · Reduce your risk of falls:  ? Wear supportive shoes with low heels and nonslip soles. ? Use a cane or walker, if you need it. Use shower chairs and bath benches. Put in handrails on stairways, around your shower or tub area, and near the toilet. ?  Keep stairs, porches, and walkways well lit. Use night-lights. ? Remove throw rugs and other objects that are in the way. ? Avoid icy, wet, or slippery surfaces. ? Keep a cordless phone and a flashlight with new batteries by your bed. When should you call for help? Watch closely for changes in your health, and be sure to contact your doctor if you have any problems. Where can you learn more? Go to http://margo-kandi.info/. Enter K100 in the search box to learn more about \"Osteoporosis: Care Instructions. \"  Current as of: March 15, 2018  Content Version: 11.9  © 8995-6582 [employer]. Care instructions adapted under license by [employer] (which disclaims liability or warranty for this information). If you have questions about a medical condition or this instruction, always ask your healthcare professional. [address] any warranty or liability for your use of this information. Learning About Living Ranulfogretchen Damien  What is a living will? A living will is a legal form you use to write down the kind of care you want at the end of your life. It is used by the health professionals who will treat you if you aren't able to decide for yourself. If you put your wishes in writing, your loved ones and others will know what kind of care you want. They won't need to guess. This can ease your mind and be helpful to others. A living will is not the same as an estate or property will. An estate will explains what you want to happen with your money and property after you die. Is a living will a legal document? A living will is a legal document. Each state has its own laws about living [place]. If you move to another state, make sure that your living will is legal in the state where you now live. Or you might use a universal form that has been approved by many states. This kind of form can sometimes be completed and stored online.  Your electronic copy will then be available wherever you have a connection to the Internet. In most cases, doctors will respect your wishes even if you have a form from a different state. · You don't need an  to complete a living will. But legal advice can be helpful if your state's laws are unclear, your health history is complicated, or your family can't agree on what should be in your living will. · You can change your living will at any time. Some people find that their wishes about end-of-life care change as their health changes. · In addition to making a living will, think about completing a medical power of  form. This form lets you name the person you want to make end-of-life treatment decisions for you (your \"health care agent\") if you're not able to. Many hospitals and nursing homes will give you the forms you need to complete a living will and a medical power of . · Your living will is used only if you can't make or communicate decisions for yourself anymore. If you become able to make decisions again, you can accept or refuse any treatment, no matter what you wrote in your living will. · Your state may offer an online registry. This is a place where you can store your living will online so the doctors and nurses who need to treat you can find it right away. What should you think about when creating a living will? Talk about your end-of-life wishes with your family members and your doctor. Let them know what you want. That way the people making decisions for you won't be surprised by your choices. Think about these questions as you make your living will:  · Do you know enough about life support methods that might be used? If not, talk to your doctor so you know what might be done if you can't breathe on your own, your heart stops, or you're unable to swallow. · What things would you still want to be able to do after you receive life-support methods? Would you want to be able to walk? To speak?  To eat on your own? To live without the help of machines? · If you have a choice, where do you want to be cared for? In your home? At a hospital or nursing home? · Do you want certain Spiritism practices performed if you become very ill? · If you have a choice at the end of your life, where would you prefer to die? At home? In a hospital or nursing home? Somewhere else? · Would you prefer to be buried or cremated? · Do you want your organs to be donated after you die? What should you do with your living will? · Make sure that your family members and your health care agent have copies of your living will. · Give your doctor a copy of your living will to keep in your medical record. If you have more than one doctor, make sure that each one has a copy. · You may want to put a copy of your living will where it can be easily found. Where can you learn more? Go to http://margo-kandi.info/. Enter G260 in the search box to learn more about \"Learning About Living [place]. \"  Current as of: August 8, 2016  Content Version: 11.3  © 6484-9990 [employer]. Care instructions adapted under license by [employer] (which disclaims liability or warranty for this information). If you have questions about a medical condition or this instruction, always ask your healthcare professional. [address] any warranty or liability for your use of this information.

## 2019-07-01 NOTE — PROGRESS NOTES
Bnig Soliman  Identified pt with two pt identifiers(name and ). Chief Complaint   Patient presents with    Blood Pressure Check     Room 14       1. Have you been to the ER, urgent care clinic since your last visit?n   Hospitalized since your last visit? n     2. Have you seen or consulted any other health care providers outside of the [address] [place] [address] since your last visit? Include any pap smears or colon screening. n       Advance Care Planning    In the event something were to happen to you and you were unable to speak on your behalf, do you have an Advance Directive/ Living Will in place stating your wishes? NO    If yes, do we have a copy on file NO    If no, would you like information NO    Medication reconciliation up to date and corrected with patient at this time. Today's provider has been notified of reason for visit, vitals and flowsheets obtained on patients. Reviewed record in preparation for visit, huddled with provider and have obtained necessary documentation.       Health Maintenance Due   Topic    Hepatitis C Screening     Shingrix Vaccine Age 50> (1 of 2)    DTaP/Tdap/Td series (1 - Tdap)    Pneumococcal 65+ years (1 of 2 - PCV13)    GLAUCOMA SCREENING Q2Y        Wt Readings from Last 3 Encounters:   19 136 lb 11.2 oz (62 kg)   19 143 lb 12.8 oz (65.2 kg)   10/09/18 143 lb 4.8 oz (65 kg)     Temp Readings from Last 3 Encounters:   19 98.6 °F (37 °C) (Oral)   19 98.5 °F (36.9 °C) (Oral)   10/09/18 98.3 °F (36.8 °C) (Temporal)     BP Readings from Last 3 Encounters:   19 126/76   19 139/83   10/09/18 136/74     Pulse Readings from Last 3 Encounters:   19 77   19 87   10/09/18 82     Vitals:    19 1345   BP: 126/76   Pulse: 77   Resp: 18   Temp: 98.6 °F (37 °C)   TempSrc: Oral   SpO2: 97%   Weight: 136 lb 11.2 oz (62 kg)   Height: 5' 3\" (1.6 m)   PainSc:   0 - No pain         Learning Assessment:  :     Learning Assessment 7/1/2019 8/1/2014   PRIMARY LEARNER Patient Patient   HIGHEST LEVEL OF EDUCATION - PRIMARY LEARNER  SOME COLLEGE SOME COLLEGE   BARRIERS PRIMARY LEARNER NONE NONE   CO-LEARNER CAREGIVER No -   PRIMARY LANGUAGE ENGLISH ENGLISH   LEARNER PREFERENCE PRIMARY DEMONSTRATION PICTURES     - VIDEOS   ANSWERED BY patient patient   RELATIONSHIP SELF SELF       Depression Screening:  :     3 most recent PHQ Screens 7/1/2019   Little interest or pleasure in doing things Not at all   Feeling down, depressed, irritable, or hopeless Not at all   Total Score PHQ 2 0       No flowsheet data found. Fall Risk Assessment:  :     Fall Risk Assessment, last 12 mths 7/1/2019   Able to walk? Yes   Fall in past 12 months? No       Abuse Screening:  :     Abuse Screening Questionnaire 10/9/2018   Do you ever feel afraid of your partner? N   Are you in a relationship with someone who physically or mentally threatens you? N   Is it safe for you to go home?  Y       ADL Screening:  :     ADL Assessment 10/9/2018   Feeding yourself No Help Needed   Getting from bed to chair No Help Needed   Getting dressed No Help Needed   Bathing or showering No Help Needed   Walk across the room (includes cane/walker) No Help Needed   Using the telphone No Help Needed   Taking your medications No Help Needed   Preparing meals No Help Needed   Managing money (expenses/bills) No Help Needed   Moderately strenuous housework (laundry) No Help Needed   Shopping for personal items (toiletries/medicines) No Help Needed   Shopping for groceries No Help Needed   Driving No Help Needed   Climbing a flight of stairs No Help Needed   Getting to places beyond walking distances No Help Needed           BMI:  Weight Metrics 7/1/2019 1/31/2019 10/9/2018 4/9/2018 1/9/2018 9/29/2017 3/17/2017   Weight 136 lb 11.2 oz 143 lb 12.8 oz 143 lb 4.8 oz 137 lb 14.4 oz 139 lb 9.6 oz 143 lb 1.6 oz 140 lb 14.4 oz   BMI 24.22 kg/m2 26.3 kg/m2 25.68 kg/m2 24.04 kg/m2 24.34 kg/m2 24.95 kg/m2 24.56 kg/m2           Medication reconciliation up to date and corrected with patient at this time.

## 2019-07-17 LAB
25(OH)D3+25(OH)D2 SERPL-MCNC: 41.4 NG/ML (ref 30–100)
ALBUMIN SERPL-MCNC: 4.4 G/DL (ref 3.6–4.8)
ALBUMIN/CREAT UR: <17.8 MG/G CREAT (ref 0–30)
ALBUMIN/GLOB SERPL: 1.9 {RATIO} (ref 1.2–2.2)
ALP SERPL-CCNC: 84 IU/L (ref 39–117)
ALT SERPL-CCNC: 15 IU/L (ref 0–32)
APPEARANCE UR: CLEAR
AST SERPL-CCNC: 15 IU/L (ref 0–40)
BILIRUB SERPL-MCNC: 0.3 MG/DL (ref 0–1.2)
BILIRUB UR QL STRIP: NEGATIVE
BUN SERPL-MCNC: 13 MG/DL (ref 8–27)
BUN/CREAT SERPL: 18 (ref 12–28)
CALCIUM SERPL-MCNC: 10.1 MG/DL (ref 8.7–10.3)
CHLORIDE SERPL-SCNC: 102 MMOL/L (ref 96–106)
CHOLEST SERPL-MCNC: 225 MG/DL (ref 100–199)
CO2 SERPL-SCNC: 27 MMOL/L (ref 20–29)
COLOR UR: YELLOW
CREAT SERPL-MCNC: 0.74 MG/DL (ref 0.57–1)
CREAT UR-MCNC: 16.9 MG/DL
ERYTHROCYTE [DISTWIDTH] IN BLOOD BY AUTOMATED COUNT: 12.4 % (ref 12.3–15.4)
EST. AVERAGE GLUCOSE BLD GHB EST-MCNC: 120 MG/DL
GLOBULIN SER CALC-MCNC: 2.3 G/DL (ref 1.5–4.5)
GLUCOSE SERPL-MCNC: 89 MG/DL (ref 65–99)
GLUCOSE UR QL: NEGATIVE
HBA1C MFR BLD: 5.8 % (ref 4.8–5.6)
HCT VFR BLD AUTO: 40.4 % (ref 34–46.6)
HCV AB S/CO SERPL IA: <0.1 S/CO RATIO (ref 0–0.9)
HDLC SERPL-MCNC: 85 MG/DL
HGB BLD-MCNC: 13.4 G/DL (ref 11.1–15.9)
HGB UR QL STRIP: NEGATIVE
INTERPRETATION, 910389: NORMAL
KETONES UR QL STRIP: NEGATIVE
LDLC SERPL CALC-MCNC: 123 MG/DL (ref 0–99)
LEUKOCYTE ESTERASE UR QL STRIP: NEGATIVE
MCH RBC QN AUTO: 29.5 PG (ref 26.6–33)
MCHC RBC AUTO-ENTMCNC: 33.2 G/DL (ref 31.5–35.7)
MCV RBC AUTO: 89 FL (ref 79–97)
MICRO URNS: NORMAL
MICROALBUMIN UR-MCNC: <3 UG/ML
NITRITE UR QL STRIP: NEGATIVE
PH UR STRIP: 7.5 [PH] (ref 5–7.5)
PLATELET # BLD AUTO: 301 X10E3/UL (ref 150–450)
POTASSIUM SERPL-SCNC: 4.4 MMOL/L (ref 3.5–5.2)
PROT SERPL-MCNC: 6.7 G/DL (ref 6–8.5)
PROT UR QL STRIP: NEGATIVE
RBC # BLD AUTO: 4.54 X10E6/UL (ref 3.77–5.28)
SODIUM SERPL-SCNC: 142 MMOL/L (ref 134–144)
SP GR UR: 1.01 (ref 1–1.03)
TRIGL SERPL-MCNC: 84 MG/DL (ref 0–149)
TSH SERPL DL<=0.005 MIU/L-ACNC: 1.23 UIU/ML (ref 0.45–4.5)
UROBILINOGEN UR STRIP-MCNC: 0.2 MG/DL (ref 0.2–1)
VLDLC SERPL CALC-MCNC: 17 MG/DL (ref 5–40)
WBC # BLD AUTO: 5.9 X10E3/UL (ref 3.4–10.8)

## 2019-08-28 ENCOUNTER — HOSPITAL ENCOUNTER (OUTPATIENT)
Dept: MAMMOGRAPHY | Age: 64
Discharge: HOME | End: 2019-08-28
Attending: FAMILY MEDICINE
Payer: MEDICARE

## 2019-08-28 DIAGNOSIS — Z85.3 HISTORY OF LEFT BREAST CANCER: ICD-10-CM

## 2019-08-28 DIAGNOSIS — Z12.39 BREAST CANCER SCREENING: ICD-10-CM

## 2019-08-28 PROCEDURE — 77063 BREAST TOMOSYNTHESIS BI: CPT

## 2019-12-23 DIAGNOSIS — I10 ESSENTIAL HYPERTENSION: ICD-10-CM

## 2019-12-23 DIAGNOSIS — R00.2 HEART PALPITATIONS: ICD-10-CM

## 2019-12-24 NOTE — TELEPHONE ENCOUNTER
PCP: Felicita Stoddard DO    Last appt: 7/16/2019  Future Appointments   Date Time Provider Harper Melton   1/6/2020  3:00 PM Felicita Stoddard DO [facility]       Requested Prescriptions     Pending Prescriptions Disp Refills    dilTIAZem (TIAZAC) 360 mg SR capsule 90 Cap 3

## 2019-12-26 RX ORDER — DILTIAZEM HYDROCHLORIDE 360 MG/1
CAPSULE, EXTENDED RELEASE ORAL
Qty: 90 CAP | Refills: 3 | OUTPATIENT
Start: 2019-12-26

## 2020-01-06 ENCOUNTER — OFFICE VISIT (OUTPATIENT)
Dept: FAMILY MEDICINE CLINIC | Age: 65
End: 2020-01-06

## 2020-01-06 VITALS
TEMPERATURE: 98.8 F | OXYGEN SATURATION: 97 % | BODY MASS INDEX: 24.63 KG/M2 | SYSTOLIC BLOOD PRESSURE: 129 MMHG | HEART RATE: 79 BPM | DIASTOLIC BLOOD PRESSURE: 80 MMHG | HEIGHT: 63 IN | RESPIRATION RATE: 16 BRPM | WEIGHT: 139 LBS

## 2020-01-06 DIAGNOSIS — I10 ESSENTIAL HYPERTENSION: Primary | ICD-10-CM

## 2020-01-06 DIAGNOSIS — E78.00 PURE HYPERCHOLESTEROLEMIA: ICD-10-CM

## 2020-01-06 DIAGNOSIS — R00.2 HEART PALPITATIONS: ICD-10-CM

## 2020-01-06 DIAGNOSIS — Z85.3 HISTORY OF LEFT BREAST CANCER: ICD-10-CM

## 2020-01-06 DIAGNOSIS — M81.0 AGE-RELATED OSTEOPOROSIS WITHOUT CURRENT PATHOLOGICAL FRACTURE: ICD-10-CM

## 2020-01-06 DIAGNOSIS — R73.9 HYPERGLYCEMIA: ICD-10-CM

## 2020-01-06 NOTE — PROGRESS NOTES
HISTORY OF PRESENT ILLNESS  Teressa Riojas is a 71 y.o. female presents with Hypertension; Results; and Referral Follow Up    Agree with nurse note. Pt with hypertension, hyperglycemia, hypercholesterolemia, heart palpitations, osteoporosis, and hx of L breast ca presents to the office with a BP of 129/80. She takes diltiazem 360 mg daily for BP; tolerating well. No recurrence of heart palpitations lately. Patient denies vision changes, headaches, dizziness, chest pain, SOB, or swelling. Pt requests to review results from 7/16/2019. CMP wnl, TSH 1.23, vit d 41.4, , HDL 85, trigs 84, A1C 5.8, CBC wnl, Hep C negative, urinalysis clear, urine microalbumin <3.     Health Maintenance    Pt had eye exam on 7/19/2019 and saw Dr. Wanda Curtis. No problems. Pt's most recent mammogram was on 8/28/2019. Negative. F/U 1 year. Written by kathryn Grayson, as dictated by Dr. Chloe Velasquez DO.    ROS    Review of Systems negative except as noted above in HPI. ALLERGIES:    Allergies   Allergen Reactions    Percocet [Oxycodone-Acetaminophen] Nausea and Vomiting     dizziness    Prednisone Nausea and Vomiting     \"withdrawals\" and insomnia    Azithromycin Nausea and Vomiting       CURRENT MEDICATIONS:    Outpatient Medications Marked as Taking for the 1/6/20 encounter (Office Visit) with Magi Dawson DO   Medication Sig Dispense Refill    dilTIAZem (TIAZAC) 360 mg SR capsule Take 1 Cap by mouth daily. 90 Cap 2    loratadine (CLARITIN) 10 mg tablet Take 10 mg by mouth.  triamcinolone (NASACORT AQ) 55 mcg nasal inhaler 2 Sprays daily.  calcium 500 mg tab Take  by mouth. Unsure of exact dose.  multivitamin (ONE A DAY) tablet Take 1 Tab by mouth daily. PAST MEDICAL HISTORY:    Past Medical History:   Diagnosis Date    Breast cancer, stage 1 ([facility] [address] 2003    Ductal Carcinoma In Situ. Radiation and surgery. Dr. Anneliese Damian. Dr. Jeff Snow.     CAD (coronary artery disease)     Chest pain 2007    Negative Cardiolite Stress Test.  Dr. Carmen Acosta Essential hypertension, benign 07    Dr. Carmen Acosta Fracture of foot 4/30/15    5th metatarsal fracture left foot. Dr. Chriss Amin. Workers [employer].  Heart palpitations 2007    Dr. Townsend Brothers disease     Dr. Raf Zee Metatarsal fracture 2006    Right 5th.  Osteoporosis 08    Dr. Tyron Durand S/P radiation therapy     Stress fracture of lower leg 08    Right distal stress reaction. PAST SURGICAL HISTORY:    Past Surgical History:   Procedure Laterality Date    HX BREAST LUMPECTOMY  2003    left due to cancer with radiation    HX COLONOSCOPY  2010    Dr. Nora Owusu. due q 10 yrs.     HX POLYPECTOMY  2008    uterine  Dr. Giles Dust HX TONSILLECTOMY  childhood    Charol Noun    Dr. Marilou Lofton       FAMILY HISTORY:    Family History   Problem Relation Age of Onset    Heart Attack Mother 72    Heart Disease Mother         CAD    High Cholesterol Mother     Other Mother         advanced Meniere's Disease    Cancer Mother 80        LUNG    Diabetes Father     Other Father 80        SEPSIS AFTER TKR (22 YRS AGO)    High Cholesterol Sister     Seizures Sister     Other Sister         MACULAR DEGENERATION    Breast Cancer Paternal Grandmother         pt was in her [de-identified]       SOCIAL HISTORY:    Social History     Socioeconomic History    Marital status:      Spouse name: Not on file    Number of children: Not on file    Years of education: Not on file    Highest education level: Not on file   Tobacco Use    Smoking status: Former Smoker     Types: Cigarettes     Last attempt to quit: 1972     Years since quittin.0    Smokeless tobacco: Never Used    Tobacco comment: smoked x6 mos at 23 y/o   Substance and Sexual Activity    Alcohol use: No    Drug use: No    Sexual activity: Yes Partners: Male     Birth control/protection: Surgical     Comment: TL       IMMUNIZATIONS:    Immunization History   Administered Date(s) Administered    H1N1 Influenza Virus Vaccine 11/01/2009    Influenza High Dose Vaccine PF 12/11/2019    Influenza Vaccine 10/13/2015, 01/05/2016, 09/26/2018    Influenza Vaccine (Quad) Mdck Pf 09/26/2018    Influenza Vaccine (Quad) PF 09/29/2017    Influenza Vaccine Split 11/01/2010, 11/01/2011    Influenza Vaccine Whole 09/01/2009    Pneumococcal Polysaccharide (PPSV-23) 03/17/2017    TD Vaccine 08/01/2009    Tdap 07/01/2019    Zoster Recombinant 04/03/2019, 07/17/2019         PHYSICAL EXAMINATION    Vital Signs    Visit Vitals  /80 (BP 1 Location: Right arm, BP Patient Position: Sitting)   Pulse 79   Temp 98.8 °F (37.1 °C) (Oral)   Resp 16   Ht 5' 3\" (1.6 m)   Wt 139 lb (63 kg)   SpO2 97%   BMI 24.62 kg/m²       Weight Metrics 1/6/2020 7/1/2019 1/31/2019 10/9/2018 4/9/2018 1/9/2018 9/29/2017   Weight 139 lb 136 lb 11.2 oz 143 lb 12.8 oz 143 lb 4.8 oz 137 lb 14.4 oz 139 lb 9.6 oz 143 lb 1.6 oz   BMI 24.62 kg/m2 24.22 kg/m2 26.3 kg/m2 25.68 kg/m2 24.04 kg/m2 24.34 kg/m2 24.95 kg/m2       General appearance - Well nourished. Well appearing. Well developed. No acute distress. Head - Normocephalic. Atraumatic. Eyes - pupils equal and reactive. Extraocular eye movements intact. Sclera anicteric. Mildly injected sclera. Ears - Hearing is grossly normal bilaterally. Nose - normal and patent. No polyps noted. No erythema. No discharge. Mouth - mucous membranes with adequate moisture. Posterior pharynx normal with cobblestone appearance. No erythema, white exudate or obstruction. Neck - supple. Midline trachea. No carotid bruits noted bilaterally. No thyromegaly noted. Chest - clear to auscultation bilaterally anteriorly and posteriorly. No wheezes. No rales or rhonchi. Breath sounds are symmetrical bilaterally.   Unlabored respirations. Heart - normal rate. Regular rhythm. Normal S1, S2. No murmur noted. No rubs, clicks or gallops noted. Abdomen - soft and distended. No masses or organomegaly. No rebound, rigidity or guarding. Bowel sounds normal x 4 quadrants. No tenderness noted. Neurological - awake, alert and oriented to person, place, and time and event. Cranial nerves II through XII intact. Clear speech. Muscle strength is +5/5 x 4 extremities. Sensation is intact to light touch bilaterally. Steady gait. Heme/Lymph - peripheral pulses normal x 4 extremities. No peripheral edema is noted. Psychological -   normal behavior, dress and thought processes. Good insight. Good eye contact. Normal affect. Appropriate mood. Normal speech. DATA REVIEWED    Lab Results   Component Value Date/Time    WBC 5.9 07/16/2019 09:23 AM    HGB 13.4 07/16/2019 09:23 AM    HCT 40.4 07/16/2019 09:23 AM    PLATELET 067 35/67/3900 09:23 AM    MCV 89 07/16/2019 09:23 AM     Lab Results   Component Value Date/Time    Sodium 142 07/16/2019 09:23 AM    Potassium 4.4 07/16/2019 09:23 AM    Chloride 102 07/16/2019 09:23 AM    CO2 27 07/16/2019 09:23 AM    Glucose 89 07/16/2019 09:23 AM    BUN 13 07/16/2019 09:23 AM    Creatinine 0.74 07/16/2019 09:23 AM    BUN/Creatinine ratio 18 07/16/2019 09:23 AM    GFR est AA 98 07/16/2019 09:23 AM    GFR est non-AA 85 07/16/2019 09:23 AM    Calcium 10.1 07/16/2019 09:23 AM    Bilirubin, total 0.3 07/16/2019 09:23 AM    AST (SGOT) 15 07/16/2019 09:23 AM    Alk.  phosphatase 84 07/16/2019 09:23 AM    Protein, total 6.7 07/16/2019 09:23 AM    Albumin 4.4 07/16/2019 09:23 AM    A-G Ratio 1.9 07/16/2019 09:23 AM    ALT (SGPT) 15 07/16/2019 09:23 AM     Lab Results   Component Value Date/Time    Cholesterol, total 225 (H) 07/16/2019 09:23 AM    HDL Cholesterol 85 07/16/2019 09:23 AM    LDL, calculated 123 (H) 07/16/2019 09:23 AM    VLDL, calculated 17 07/16/2019 09:23 AM    Triglyceride 84 07/16/2019 09:23 AM     Lab Results   Component Value Date/Time    Vitamin D 25-Hydroxy 37.1 06/20/2011 08:19 AM    VITAMIN D, 25-HYDROXY 41.4 07/16/2019 09:23 AM       Lab Results   Component Value Date/Time    Hemoglobin A1c 5.8 (H) 07/16/2019 09:23 AM     Lab Results   Component Value Date/Time    TSH 1.230 07/16/2019 09:23 AM       Lab Results   Component Value Date/Time    Microalb/Creat ratio (ug/mg creat.) <17.8 07/16/2019 09:23 AM         ASSESSMENT and PLAN      ICD-10-CM ICD-9-CM    1. Essential hypertension I10 401.9 LIPID PANEL      METABOLIC PANEL, COMPREHENSIVE      TSH 3RD GENERATION      MICROALBUMIN, UR, RAND W/ MICROALB/CREAT RATIO      URINALYSIS W/ RFLX MICROSCOPIC    stable on Diltiazem   2. Hyperglycemia Y67.5 559.85 METABOLIC PANEL, COMPREHENSIVE      HEMOGLOBIN A1C WITH EAG    slightly worse with increased sugar intake   3. Pure hypercholesterolemia E78.00 272.0 LIPID PANEL      METABOLIC PANEL, COMPREHENSIVE    slightly worse with increased sugar intake   4. Heart palpitations R00.2 785.1     stable on Diltiazem   5. History of left breast cancer Z85.3 V10.3    6. Age-related osteoporosis without current pathological fracture M81.0 733.01 VITAMIN D, 25 HYDROXY       Discussed the patient's BMI with her. The BMI follow up plan is as follows: I have counseled this patient on diet and exercise regimens. Decrease carbohydrates (white foods, sweet foods, sweet drinks and alcohol), increase green leafy vegetables and protein (lean meats and beans) with each meal.  Avoid fried foods. Eat 3-5 small meals daily. Do not skip meals. Increase water intake. Increase physical activity to 30 minutes daily for health benefit or 60 minutes daily to prevent weight regain, as tolerated. Get 7-8 hours uninterrupted sleep nightly. Chart reviewed and updated. Continue current medications and care. Most recent tests reviewed from 7/16/2019. Recheck pertinent labs today.   Counseled patient on health concerns:  Blood pressure, hyperglycemia, cholesterol, heart palpitations, osteoporosis. Relevant handouts given and discussed with patient. Immunizations noted. Offered empathy, support, legitimation, prayers, partnership to patient. Praised patient for progress. Follow-up and Dispositions    · Return in about 6 months (around 7/6/2020) for medicare wellness visit, blood pressure, results. Patient was offered a choice/choices in the treatment plan today. Patient expresses understanding of the plan and agrees with recommendations. Written by kathryn Holman, as dictated by Dr. Roshan Sanchez DO. Documentation True and Accepted by Yumiko Alberto. Kayla Winter. There are no Patient Instructions on file for this visit.

## 2020-01-06 NOTE — PROGRESS NOTES
Identified pt with two pt identifiers(name and ). Reviewed record in preparation for visit and have obtained necessary documentation. Chief Complaint   Patient presents with    Hypertension    Results    Referral Follow Up        Health Maintenance Due   Topic    GLAUCOMA SCREENING Q2Y         Visit Vitals  /80 (BP 1 Location: Right arm, BP Patient Position: Sitting)   Pulse 79   Temp 98.8 °F (37.1 °C) (Oral)   Resp 16   Ht 5' 3\" (1.6 m)   Wt 139 lb (63 kg)   SpO2 97%   BMI 24.62 kg/m²     Pain Scale: 0 - No pain/10    Coordination of Care Questionnaire:  :   1. Have you been to the ER, urgent care clinic since your last visit? Hospitalized since your last visit? No    2. Have you seen or consulted any other health care providers outside of the [address] [place] [address] since your last visit? Include any pap smears or colon screening.  No

## 2020-07-06 ENCOUNTER — VIRTUAL VISIT (OUTPATIENT)
Dept: FAMILY MEDICINE CLINIC | Age: 65
End: 2020-07-06

## 2020-07-06 DIAGNOSIS — I10 ESSENTIAL HYPERTENSION: ICD-10-CM

## 2020-07-06 DIAGNOSIS — Z12.11 COLON CANCER SCREENING: ICD-10-CM

## 2020-07-06 DIAGNOSIS — R00.2 HEART PALPITATIONS: ICD-10-CM

## 2020-07-06 DIAGNOSIS — M81.0 AGE-RELATED OSTEOPOROSIS WITHOUT CURRENT PATHOLOGICAL FRACTURE: ICD-10-CM

## 2020-07-06 DIAGNOSIS — Z13.31 SCREENING FOR DEPRESSION: ICD-10-CM

## 2020-07-06 DIAGNOSIS — M79.644 PAIN IN FINGER OF RIGHT HAND: ICD-10-CM

## 2020-07-06 DIAGNOSIS — Z13.39 SCREENING FOR ALCOHOLISM: ICD-10-CM

## 2020-07-06 DIAGNOSIS — Z85.3 HISTORY OF LEFT BREAST CANCER: ICD-10-CM

## 2020-07-06 DIAGNOSIS — Z00.00 MEDICARE ANNUAL WELLNESS VISIT, INITIAL: Primary | ICD-10-CM

## 2020-07-06 DIAGNOSIS — R73.9 HYPERGLYCEMIA: ICD-10-CM

## 2020-07-06 DIAGNOSIS — E78.00 PURE HYPERCHOLESTEROLEMIA: ICD-10-CM

## 2020-07-06 RX ORDER — DILTIAZEM HYDROCHLORIDE 360 MG/1
360 CAPSULE, EXTENDED RELEASE ORAL DAILY
Qty: 90 CAP | Refills: 3 | Status: SHIPPED | OUTPATIENT
Start: 2020-07-06

## 2020-07-06 NOTE — PROGRESS NOTES
VIRTUAL VISIT      Pursuant to the emergency declaration under the [address] and [facility], 1135 waiver authority and the [employer] Act, this Virtual Visit was conducted, with patient's consent, to reduce the patient's risk of exposure to COVID-19 and provide continuity of care for an established patient. Services were provided through a video synchronous discussion virtually to substitute for in-person appointment. We lost connection after 25 minutes. After several attempts to reconnect, we decided to finish the AWV visit by phone. Consent:  She and/or her healthcare decision maker is aware that this patient-initiated Telehealth encounter is a billable service, with coverage as determined by her insurance carrier. She is aware that she may receive a bill and has provided verbal consent to proceed: Yes    Perry Elliott is a 76 y.o. female presents with Hypertension (Follow up); Medication Refill; Labs; and Finger Pain    Agree with nurse note. Pt with hypertension, heart palpitations, pure hypercholesterolemia, hyperglycemia, age-related osteoporosis, and hx of L breast ca with a BP of 136/85. She takes diltiazem 360 mg daily for BP; tolerating well. No recurrence of heart palpitations lately. Patient denies vision changes, headaches, dizziness, chest pain, SOB, or swelling. To recall labs from 7/16/2019. CMP wnl, TSH 1.23, vit d 41.4, , HDL 85, trigs 84, A1C 5.8, CBC wnl, Hep C negative, urinalysis clear, urine microalbumin <3.     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 Pt's son is getting  in 8/2020. If she does return to the school system, it will be after her son's wedding. Pt with R hand dominance complains of pain involving R index finger. She notes pain with usage.   She can not use spray pumps or make a complete fist with the R index. Health Maintenance    Pt's most recent colonoscopy on 6/24/2010 with Dr. Sultana Galarza was normal.     Pt's most recent eye exam on 7/19/2019 with Dr. Carie Rhoades was normal.    Pt's most recent mammogram on 8/28/2019 was negative. F/u 1 year. Pt sees Dr. Lyla Isbell for annual pelvic exams and osteoporosis. Pt previously saw Dr. Leona Hinojosa for osteoporosis. Written by kathryn Mora, as dictated by Dr. Lilia Littlejohn DO.    ROS    Review of Systems negative except as noted above in HPI. ALLERGIES:    Allergies   Allergen Reactions    Percocet [Oxycodone-Acetaminophen] Nausea and Vomiting     dizziness    Prednisone Nausea and Vomiting     \"withdrawals\" and insomnia    Azithromycin Nausea and Vomiting       CURRENT MEDICATIONS:    Outpatient Medications Marked as Taking for the 7/6/20 encounter (Virtual Visit) with Mikey Meckel, DO   Medication Sig Dispense Refill    dilTIAZem ER (TIAZAC) 360 mg capsule Take 1 Cap by mouth daily. Indications: high blood pressure, heart palpitation 90 Cap 3    loratadine (CLARITIN) 10 mg tablet Take 10 mg by mouth.  triamcinolone (NASACORT AQ) 55 mcg nasal inhaler 2 Sprays daily.  calcium 500 mg tab Take  by mouth. Unsure of exact dose.  multivitamin (ONE A DAY) tablet Take 1 Tab by mouth daily. PAST MEDICAL HISTORY:    Past Medical History:   Diagnosis Date    Breast cancer, stage 1 ([facility] [address] 2003    Ductal Carcinoma In Situ. Radiation and surgery. Dr. Dameon Brar. Dr. Rockwell Solid.  CAD (coronary artery disease)     Chest pain 07/2007    Negative Cardiolite Stress Test.  Dr. Dinorah Larose Essential hypertension, benign 07/26/07    Dr. Dinorah Larose Fracture of foot 4/30/15    5th metatarsal fracture left foot. Dr. Gaurav Kellogg. Workers Comp.     Heart palpitations 07/2007    Dr. Denisse Flores disease     Dr. Ila Manjarrez Metatarsal fracture 2006    Right 5th.  Osteoporosis 2008    Dr. Byron Lemus. Dr. Rubia Gilmore    S/P radiation therapy     Stress fracture of lower leg 08    Right distal stress reaction. PAST SURGICAL HISTORY:    Past Surgical History:   Procedure Laterality Date    HX BREAST LUMPECTOMY  2003    left due to cancer with radiation    HX COLONOSCOPY  2010    Dr. Freda Persaud. due q 10 yrs.     HX POLYPECTOMY  2008    uterine  Dr. Clemencia Singh  childhood    Lindsey Band    Dr. Emmanuel Side       FAMILY HISTORY:    Family History   Problem Relation Age of Onset    Heart Attack Mother 72    Heart Disease Mother         CAD    High Cholesterol Mother     Other Mother         advanced Meniere's Disease    Cancer Mother 80        LUNG    Diabetes Father     Other Father 80        SEPSIS AFTER TKR (25 YRS AGO)    High Cholesterol Sister     Seizures Sister     Other Sister         MACULAR DEGENERATION    Breast Cancer Paternal Grandmother         pt was in her [de-identified]       SOCIAL HISTORY:    Social History     Socioeconomic History    Marital status:      Spouse name: Not on file    Number of children: Not on file    Years of education: Not on file    Highest education level: Not on file   Tobacco Use    Smoking status: Former Smoker     Types: Cigarettes     Last attempt to quit: 1972     Years since quittin.5    Smokeless tobacco: Never Used    Tobacco comment: smoked x6 mos at 23 y/o   Substance and Sexual Activity    Alcohol use: No    Drug use: No    Sexual activity: Yes     Partners: Male     Birth control/protection: Surgical     Comment: TL       IMMUNIZATIONS:    Immunization History   Administered Date(s) Administered    H1N1 Influenza Virus Vaccine 2009    Influenza High Dose Vaccine PF 2019    Influenza Vaccine 10/13/2015, 2016, 2018    Influenza Vaccine (Quad) Mdck Pf 2018  Influenza Vaccine (Quad) PF 09/29/2017    Influenza Vaccine Split 11/01/2010, 11/01/2011    Influenza Vaccine Whole 09/01/2009    Pneumococcal Polysaccharide (PPSV-23) 03/17/2017    TD Vaccine 08/01/2009    Tdap 07/01/2019    Zoster Recombinant 04/03/2019, 07/17/2019         PHYSICAL EXAMINATION (PER VISUAL INSPECTION AND INSTRUCTIONS GIVEN TO PATIENT TO PERFORM)    Due to this being a TeleHealth encounter (During [facility]-02 public health emergency), evaluation of the following organ systems was limited to:     Vital Signs  There were no vitals taken for this visit. Weight Metrics 1/6/2020 7/1/2019 1/31/2019 10/9/2018 4/9/2018 1/9/2018 9/29/2017   Weight 139 lb 136 lb 11.2 oz 143 lb 12.8 oz 143 lb 4.8 oz 137 lb 14.4 oz 139 lb 9.6 oz 143 lb 1.6 oz   BMI 24.62 kg/m2 24.22 kg/m2 26.3 kg/m2 25.68 kg/m2 24.04 kg/m2 24.34 kg/m2 24.95 kg/m2       General appearance - Well nourished. Well appearing. Well developed. No acute distress. Overweight. Head - Normocephalic. Atraumatic. Eyes - Extraocular eye movements intact. Sclera anicteric. Mildly injected sclera. Ears - Hearing is grossly normal bilaterally. Nose - normal and patent. No discharge. Chest - No visualized signs of difficulty breathing or respiratory distress. Heart - normal rate. Neurological - awake, alert and oriented to person, place, and time and event. Cranial nerves II through XII intact. Clear speech. Musculoskeletal - Intact x 4 extremities. No pain with movement. Psychological -   normal behavior, dress and thought processes. Good insight. Good eye contact. Normal affect. Appropriate mood. Normal speech.       DATA REVIEWED    Lab Results   Component Value Date/Time    WBC 5.9 07/16/2019 09:23 AM    HGB 13.4 07/16/2019 09:23 AM    HCT 40.4 07/16/2019 09:23 AM    PLATELET 371 17/63/8014 09:23 AM    MCV 89 07/16/2019 09:23 AM     Lab Results   Component Value Date/Time    Sodium 142 07/16/2019 09:23 AM Potassium 4.4 07/16/2019 09:23 AM    Chloride 102 07/16/2019 09:23 AM    CO2 27 07/16/2019 09:23 AM    Glucose 89 07/16/2019 09:23 AM    BUN 13 07/16/2019 09:23 AM    Creatinine 0.74 07/16/2019 09:23 AM    BUN/Creatinine ratio 18 07/16/2019 09:23 AM    GFR est AA 98 07/16/2019 09:23 AM    GFR est non-AA 85 07/16/2019 09:23 AM    Calcium 10.1 07/16/2019 09:23 AM    Bilirubin, total 0.3 07/16/2019 09:23 AM    Alk. phosphatase 84 07/16/2019 09:23 AM    Protein, total 6.7 07/16/2019 09:23 AM    Albumin 4.4 07/16/2019 09:23 AM    A-G Ratio 1.9 07/16/2019 09:23 AM    ALT (SGPT) 15 07/16/2019 09:23 AM     Lab Results   Component Value Date/Time    Cholesterol, total 225 (H) 07/16/2019 09:23 AM    HDL Cholesterol 85 07/16/2019 09:23 AM    LDL, calculated 123 (H) 07/16/2019 09:23 AM    VLDL, calculated 17 07/16/2019 09:23 AM    Triglyceride 84 07/16/2019 09:23 AM     Lab Results   Component Value Date/Time    Vitamin D 25-Hydroxy 37.1 06/20/2011 08:19 AM    VITAMIN D, 25-HYDROXY 41.4 07/16/2019 09:23 AM       Lab Results   Component Value Date/Time    Hemoglobin A1c 5.8 (H) 07/16/2019 09:23 AM     Lab Results   Component Value Date/Time    TSH 1.230 07/16/2019 09:23 AM       Lab Results   Component Value Date/Time    Microalb/Creat ratio (ug/mg creat.) <17.8 07/16/2019 09:23 AM         ASSESSMENT and PLAN      ICD-10-CM ICD-9-CM    1. Medicare annual wellness visit, initial  Z00.00 V70.0    2. Screening for depression  Z13.31 V79.0 DEPRESSION SCREEN ANNUAL   3. Screening for alcoholism  Z13.39 V79.1 [place] ANNUAL ALCOHOL SCREEN 15 MIN   4. Essential hypertension  I10 401.9 dilTIAZem ER (TIAZAC) 360 mg capsule      LIPID PANEL      URINALYSIS W/ RFLX MICROSCOPIC      METABOLIC PANEL, COMPREHENSIVE      TSH 3RD GENERATION      MICROALBUMIN, UR, RAND W/ MICROALB/CREAT RATIO      HEMOGLOBIN A1C WITH EAG    slightly elevated   5.  Heart palpitations  R00.2 785.1 dilTIAZem ER (TIAZAC) 360 mg capsule      TSH 3RD GENERATION    stable on Cardizem 300 mg daily   6. Hyperglycemia  D96.6 150.82 METABOLIC PANEL, COMPREHENSIVE      HEMOGLOBIN A1C WITH EAG   7. History of left breast cancer  Z85.3 V10.3    8. Pure hypercholesterolemia  E78.00 272.0    9. Pain in finger of right hand  M79.644 729.5 RHEUMATOID FACTOR, QL      URIC ACID   10. Age-related osteoporosis without current pathological fracture  M81.0 733.01 VITAMIN D, 25 HYDROXY   11. Colon cancer screening  Z12.11 V76.51 REFERRAL TO GASTROENTEROLOGY       Discussed the patient's BMI with her. The BMI follow up plan is as follows: I have counseled this patient on diet and exercise regimens. Decrease carbohydrates (white foods, sweet foods, sweet drinks and alcohol), increase green leafy vegetables and protein (lean meats and beans) with each meal.  Avoid fried foods. Eat 3-5 small meals daily. Do not skip meals. Increase water intake. Increase physical activity to 30 minutes daily for health benefit or 60 minutes daily to prevent weight regain, as tolerated. Get 7-8 hours uninterrupted sleep nightly. Chart reviewed and updated. Continue current medications and care. Recommend OTC Aleve for finger pain with food. Monitor bp with use. Prescriptions written and sent to pharmacy; medication side effects discussed. diltiazem 360 mg   Lab orders mailed due to COVID-19. Most recent tests reviewed from 7/6/2019. Referrals given; patient urged to keep appointments with specialists. Gastroenterology  Counseled patient on health concerns: blood pressure, stress, finger pain   Relevant handouts given and discussed with patient. Offered empathy, support, legitimation, prayers, partnership to patient. Immunizations noted; Influenza, Prevnar- 13  Praised patient for progress. Encouraged the pt to sign up for [employer] to be able to view results and send me any questions or concerns prior to the next visit where we will go over results in detail.    Follow-up and Dispositions    · Return in about 3 months (around 10/6/2020) for blood pressure, results, referral follow up. Patient was offered a choice/choices in the treatment plan today. Patient expresses understanding of the plan and agrees with recommendations. 40 mins spent face to face with patient and more than 50% of this time spent in counseling and coordinating care. Written by kathryn Seay, as dictated by Dr. Lelo Noble DO. Documentation True and Accepted by Christiano Serrano.  Raegan Cuevas.

## 2020-07-06 NOTE — PROGRESS NOTES
[place] is a 76 y.o. female     HIPAA verified by two patient identifiers. Chief Complaint   Patient presents with    Hypertension     Follow up     1. Have you been to the ER, urgent care clinic since your last visit? Hospitalized since your last visit? No    2. Have you seen or consulted any other health care providers outside of the [address] [place] [address] since your last visit? Include any pap smears or colon screening. No    DOXY: [phone]  Pt states she has sent vitals in through [address]

## 2020-07-06 NOTE — PATIENT INSTRUCTIONS
Medicare Wellness Visit, Female     The best way to live healthy is to have a lifestyle where you eat a well-balanced diet, exercise regularly, limit alcohol use, and quit all forms of tobacco/nicotine, if applicable. Regular preventive services are another way to keep healthy. Preventive services (vaccines, screening tests, monitoring & exams) can help personalize your care plan, which helps you manage your own care. Screening tests can find health problems at the earliest stages, when they are easiest to treat. [facility] follows the current, evidence-based guidelines published by the [facility]ons[facility] ([facility]) when recommending preventive services for our patients. Because we follow these guidelines, sometimes recommendations change over time as research supports it. (For example, mammograms used to be recommended annually. Even though Medicare will still pay for an annual mammogram, the newer guidelines recommend a mammogram every two years for women of average risk). Of course, you and your doctor may decide to screen more often for some diseases, based on your risk and your co-morbidities (chronic disease you are already diagnosed with). Preventive services for you include:  - Medicare offers their members a free annual wellness visit, which is time for you and your primary care provider to discuss and plan for your preventive service needs. Take advantage of this benefit every year!  -All adults over the age of 72 should receive the recommended pneumonia vaccines. Current USPSTF guidelines recommend a series of two vaccines for the best pneumonia protection.   -All adults should have a flu vaccine yearly and a tetanus vaccine every 10 years.   -All adults age 48 and older should receive the shingles vaccines (series of two vaccines).       -All adults age 38-68 who are overweight should have a diabetes screening test once every three years.   -All adults born between [birth year] and 1965 should be screened once for Hepatitis C.  -Other screening tests and preventive services for persons with diabetes include: an eye exam to screen for diabetic retinopathy, a kidney function test, a foot exam, and stricter control over your cholesterol.   -Cardiovascular screening for adults with routine risk involves an electrocardiogram (ECG) at intervals determined by your doctor.   -Colorectal cancer screenings should be done for adults age 54-65 with no increased risk factors for colorectal cancer. There are a number of acceptable methods of screening for this type of cancer. Each test has its own benefits and drawbacks. Discuss with your doctor what is most appropriate for you during your annual wellness visit. The different tests include: colonoscopy (considered the best screening method), a fecal occult blood test, a fecal DNA test, and sigmoidoscopy.    -A bone mass density test is recommended when a woman turns 65 to screen for osteoporosis. This test is only recommended one time, as a screening. Some providers will use this same test as a disease monitoring tool if you already have osteoporosis. -Breast cancer screenings are recommended every other year for women of normal risk, age 54-69.  -Cervical cancer screenings for women over age 72 are only recommended with certain risk factors. Here is a list of your current Health Maintenance items (your personalized list of preventive services) with a due date:  Health Maintenance Due   Topic Date Due    Annual Well Visit  07/01/2020    Hemoglobin A1C    07/16/2020              Osteoporosis: Care Instructions  Your Care Instructions     Osteoporosis causes bones to become thin and weak. It is much more common in women than in men. Osteoporosis may be very advanced before you know you have it. Sometimes the first sign is a broken bone in the hip, spine, or wrist or sudden pain in your middle or lower back.   Follow-up care is a key part of your treatment and safety. Be sure to make and go to all appointments, and call your doctor if you are having problems. It's also a good idea to know your test results and keep a list of the medicines you take. How can you care for yourself at home? · Your doctor may prescribe a bisphosphonate, such as risedronate (Actonel) or alendronate (Fosamax), for osteoporosis. If you are taking one of these medicines by mouth:  ? Take your medicine with a full glass of water when you first get up in the morning. ? Do not lie down, eat, drink a beverage, or take any other medicine for at least 30 minutes after taking the drug. This helps prevent stomach problems. ? Do not take your medicine late in the day if you forgot to take it in the morning. Skip it, and take the usual dose the next morning. ? If you have side effects, tell your doctor. He or she may prescribe another medicine. · Get enough calcium and vitamin D. The [place] of Medicine recommends adults younger than age 46 need 1,000 mg of calcium and 600 IU of vitamin D each day. Women ages 46 to 79 need 1,200 mg of calcium and 600 IU of vitamin D each day. Men ages 46 to 79 need 1,000 mg of calcium and 600 IU of vitamin D each day. Adults 71 and older need 1,200 mg of calcium and 800 IU of vitamin D each day. It's not clear if people who already have osteoporosis need more calcium and vitamin D than this. Talk to your doctor about what's right for you.  ? Eat foods rich in calcium, like yogurt, cheese, milk, and dark green vegetables. This is a good way to get the calcium you need. You can get vitamin D from eggs, fatty fish, cereal, and milk. ? Ask your doctor if you need to take a calcium plus vitamin D supplement. You may be able to get enough calcium and vitamin D through your diet. Be careful with supplements.  Adults ages 23 to 48 should not get more than 2,500 mg of calcium and 4,000 IU of vitamin D each day, whether it is from supplements and/or food. Adults ages 46 and older should not get more than 2,000 mg of calcium and 4,000 IU of vitamin D each day from supplements and/or food. · Limit alcohol to 2 drinks a day for men and 1 drink a day for women. Too much alcohol can cause health problems. · Do not smoke. Smoking puts you at a much higher risk for osteoporosis. If you need help quitting, talk to your doctor about stop-smoking programs and medicines. These can increase your chances of quitting for good. · Get regular bone-building exercise. Weight-bearing and resistance exercises keep bones healthy by working the muscles and bones against gravity. Start out at an exercise level that feels right for you. Add a little at a time until you can do the following:  ? Do 30 minutes of weight-bearing exercise on most days of the week. Walking, jogging, stair climbing, and dancing are good choices. ? Do resistance exercises with weights or elastic bands 2 to 3 days a week. · Reduce your risk of falls:  ? Wear supportive shoes with low heels and nonslip soles. ? Use a cane or walker, if you need it. Use shower chairs and bath benches. Put in handrails on stairways, around your shower or tub area, and near the toilet. ? Keep stairs, porches, and walkways well lit. Use night-lights. ? Remove throw rugs and other objects that are in the way. ? Avoid icy, wet, or slippery surfaces. ? Keep a cordless phone and a flashlight with new batteries by your bed. When should you call for help? Watch closely for changes in your health, and be sure to contact your doctor if you have any problems. Where can you learn more? Go to http://margo-kandi.info/  Enter K100 in the search box to learn more about \"Osteoporosis: Care Instructions. \"  Current as of: August 7, 2019               Content Version: 12.5  © 7859-9050 [employer], [employer].    Care instructions adapted under license by [employer] (which disclaims liability or warranty for this information). If you have questions about a medical condition or this instruction, always ask your healthcare professional. [place] [address] any warranty or liability for your use of this information.

## 2020-07-06 NOTE — PROGRESS NOTES
This is an Initial Medicare Annual Wellness Exam (AWV) (Performed 12 months after IPPE or effective date of Medicare Part B enrollment, Once in a lifetime)    I have reviewed the patient's medical history in detail and updated the computerized patient record. History     Patient Active Problem List   Diagnosis Code    Pure hypercholesterolemia E78.00    Osteoporosis M81.0    Hyperglycemia R73.9    Heart palpitations R00.2    History of left breast cancer Z85.3    Easy bruisability R23.8    Essential hypertension I10    Weight loss R63.4     Past Medical History:   Diagnosis Date    Breast cancer, stage 1 ([address] 2003    Ductal Carcinoma In Situ. Radiation and surgery. Dr. Valentino Nails. Dr. Jhonatan Martinez.  CAD (coronary artery disease)     Chest pain 07/2007    Negative Cardiolite Stress Test.  Dr. Didier Kelly Essential hypertension, benign 07/26/07    Dr. Didier Kelly Fracture of foot 4/30/15    5th metatarsal fracture left foot. Dr. Arnulfo Gill. [employer].  Heart palpitations 07/2007    Dr. Miguelito Bush disease     Dr. Ricarda Arambula Metatarsal fracture 12/2006    Right 5th.  Osteoporosis 08/20/2008    Dr. Nelly Dickens. Dr. Mychal Salinas    S/P radiation therapy 2003    Stress fracture of lower leg 06/11/08    Right distal stress reaction. Past Surgical History:   Procedure Laterality Date    HX BREAST LUMPECTOMY  2003    left due to cancer with radiation    HX COLONOSCOPY  06/24/2010    Dr. Ronnell Amanda. due q 10 yrs.  HX POLYPECTOMY  04/2008    uterine  Dr. Maya Andrea HX TONSILLECTOMY  childhood    Fariba Moose    Dr. Cherylann Lefort     Current Outpatient Medications   Medication Sig Dispense Refill    dilTIAZem ER (TIAZAC) 360 mg capsule Take 1 Cap by mouth daily. Indications: high blood pressure, heart palpitation 90 Cap 3    loratadine (CLARITIN) 10 mg tablet Take 10 mg by mouth.       triamcinolone (NASACORT AQ) 55 mcg nasal inhaler 2 Sprays daily.  calcium 500 mg tab Take  by mouth. Unsure of exact dose.  multivitamin (ONE A DAY) tablet Take 1 Tab by mouth daily.        Allergies   Allergen Reactions    Percocet [Oxycodone-Acetaminophen] Nausea and Vomiting     dizziness    Prednisone Nausea and Vomiting     \"withdrawals\" and insomnia    Azithromycin Nausea and Vomiting       Family History   Problem Relation Age of Onset    Heart Attack Mother 72    Heart Disease Mother         CAD    High Cholesterol Mother     Other Mother         advanced Meniere's Disease    Cancer Mother 80        LUNG    Diabetes Father     Other Father 80        SEPSIS AFTER TKR (22 YRS AGO)    High Cholesterol Sister     Seizures Sister     Other Sister         MACULAR DEGENERATION    Breast Cancer Paternal Grandmother         pt was in her [de-identified]     Social History     Tobacco Use    Smoking status: Former Smoker     Types: Cigarettes     Last attempt to quit: 1972     Years since quittin.5    Smokeless tobacco: Never Used    Tobacco comment: smoked x6 mos at 21 y/o   Substance Use Topics    Alcohol use: No       Depression Risk Factor Screening:     3 most recent PHQ Screens 2020   Little interest or pleasure in doing things Not at all   Feeling down, depressed, irritable, or hopeless Not at all   Total Score PHQ 2 0   Trouble falling or staying asleep, or sleeping too much Not at all   Feeling tired or having little energy Not at all   Poor appetite, weight loss, or overeating Not at all   Feeling bad about yourself - or that you are a failure or have let yourself or your family down Not at all   Trouble concentrating on things such as school, work, reading, or watching TV Not at all   Moving or speaking so slowly that other people could have noticed; or the opposite being so fidgety that others notice Not at all   Thoughts of being better off dead, or hurting yourself in some way Not at all   PHQ 9 Score 0 Alcohol Risk Factor Screening:   Do you average 1 drink per night or more than 7 drinks a week:  No    On any one occasion in the past three months have you have had more than 3 drinks containing alcohol:  No      Functional Ability and Level of Safety:   Hearing: The patient needs further evaluation. Pt has decreased hearing in her Left ear. She declines ENT referral today. Activities of Daily Living: The home contains: handrails and grab bars in her shower. Patient does total self care     Ambulation: with no difficulty      Fall Risk:  Fall Risk Assessment, last 12 mths 7/6/2020   Able to walk? Yes   Fall in past 12 months? No     Abuse Screen:  Patient is not abused       Cognitive Screening   Has your family/caregiver stated any concerns about your memory: no     Cognitive Screening: AAOx4    Patient Care Team   Patient Care Team:  Catrina Yuan DO as PCP - General  aCtrina Yuan DO as PCP - [facility] Empaneled Provider  Hiral Mayer MD (Obstetrics & Gynecology)  Jaimie Monterroso MD (Endocrinology)  Devin Garcia MD (Cardiology)  Drea Diaz MD (Breast Surgery)  Silver Vera OD (Optometry)    Assessment/Plan   Education and counseling provided:  Are appropriate based on today's review and evaluation  End-of-Life planning (with patient's consent)  Pneumococcal Vaccine  Influenza Vaccine  Screening Mammography  Screening Pap and pelvic (covered once every 2 years)  Colorectal cancer screening tests  Cardiovascular screening blood test  Bone mass measurement (DEXA)  Screening for glaucoma  Diabetes screening test    Diagnoses and all orders for this visit:    1. Medicare annual wellness visit, initial    2. Screening for depression  -     DEPRESSION SCREEN ANNUAL    3. Screening for alcoholism  -     GA ANNUAL ALCOHOL SCREEN 15 MIN    4. Essential hypertension  Comments:  slightly elevated  Orders:  -     dilTIAZem ER (TIAZAC) 360 mg capsule; Take 1 Cap by mouth daily. Indications: high blood pressure, heart palpitation  -     LIPID PANEL; Future  -     URINALYSIS W/ RFLX MICROSCOPIC; Future  -     METABOLIC PANEL, COMPREHENSIVE; Future  -     TSH 3RD GENERATION; Future  -     MICROALBUMIN, UR, RAND W/ MICROALB/CREAT RATIO; Future  -     HEMOGLOBIN A1C WITH EAG; Future    5. Heart palpitations  Comments:  stable on Cardizem 300 mg daily  Orders:  -     dilTIAZem ER (TIAZAC) 360 mg capsule; Take 1 Cap by mouth daily. Indications: high blood pressure, heart palpitation  -     TSH 3RD GENERATION; Future    6. Hyperglycemia  -     METABOLIC PANEL, COMPREHENSIVE; Future  -     HEMOGLOBIN A1C WITH EAG; Future    7. History of left breast cancer    8. Pure hypercholesterolemia    9. Pain in finger of right hand  -     RHEUMATOID FACTOR, QL; Future  -     URIC ACID; Future    10. Age-related osteoporosis without current pathological fracture  -     VITAMIN D, 25 HYDROXY; Future    11. Colon cancer screening  -     REFERRAL TO GASTROENTEROLOGY         Health Maintenance Due   Topic Date Due    Medicare Yearly Exam  07/01/2020    A1C test (Diabetic or Prediabetic)  07/16/2020         Amina Kessler, who was evaluated through a synchronous (real-time) audio only encounter, and/or her healthcare decision maker, is aware that it is a billable service, with coverage as determined by her insurance carrier. She provided verbal consent to proceed: Yes, and patient identification was verified. It was conducted pursuant to the emergency declaration under the [facility], [address] [place] [address] authority and the [employer] Act. A caregiver was present when appropriate. Ability to conduct physical exam was limited. I was in the office. The patient was at home.       Lyn Padilla DO

## 2020-09-03 ENCOUNTER — HOSPITAL ENCOUNTER (OUTPATIENT)
Dept: MAMMOGRAPHY | Age: 65
Discharge: HOME | End: 2020-09-03
Attending: FAMILY MEDICINE
Payer: MEDICARE

## 2020-09-03 DIAGNOSIS — Z12.31 VISIT FOR SCREENING MAMMOGRAM: ICD-10-CM

## 2020-09-03 PROCEDURE — 77063 BREAST TOMOSYNTHESIS BI: CPT

## 2021-07-23 ENCOUNTER — TRANSCRIBE ORDER (OUTPATIENT)
Dept: SCHEDULING | Age: 66
End: 2021-07-23

## 2021-07-23 DIAGNOSIS — Z12.31 VISIT FOR SCREENING MAMMOGRAM: Primary | ICD-10-CM

## 2021-09-16 ENCOUNTER — HOSPITAL ENCOUNTER (OUTPATIENT)
Dept: MAMMOGRAPHY | Age: 66
Discharge: HOME | End: 2021-09-16
Attending: FAMILY MEDICINE
Payer: MEDICARE

## 2021-09-16 DIAGNOSIS — Z12.31 VISIT FOR SCREENING MAMMOGRAM: ICD-10-CM

## 2021-09-16 PROCEDURE — 77063 BREAST TOMOSYNTHESIS BI: CPT

## 2022-03-19 PROBLEM — Z85.3 HISTORY OF LEFT BREAST CANCER: Status: ACTIVE | Noted: 2017-03-17

## 2022-03-19 PROBLEM — R63.4 WEIGHT LOSS: Status: ACTIVE | Noted: 2019-07-01

## 2022-03-20 PROBLEM — I10 ESSENTIAL HYPERTENSION: Status: ACTIVE | Noted: 2018-01-09

## 2022-03-20 PROBLEM — R23.3 EASY BRUISABILITY: Status: ACTIVE | Noted: 2017-09-29

## 2022-11-08 ENCOUNTER — HOSPITAL ENCOUNTER (OUTPATIENT)
Dept: MAMMOGRAPHY | Age: 67
Discharge: HOME | End: 2022-11-08
Attending: FAMILY MEDICINE
Payer: MEDICARE

## 2022-11-08 DIAGNOSIS — Z12.31 VISIT FOR SCREENING MAMMOGRAM: ICD-10-CM

## 2022-11-08 PROCEDURE — 77063 BREAST TOMOSYNTHESIS BI: CPT

## 2023-07-19 ENCOUNTER — TRANSCRIBE ORDERS (OUTPATIENT)
Facility: HOSPITAL | Age: 68
End: 2023-07-19

## 2023-07-19 DIAGNOSIS — Z12.31 VISIT FOR SCREENING MAMMOGRAM: Primary | ICD-10-CM

## 2023-11-13 ENCOUNTER — HOSPITAL ENCOUNTER (OUTPATIENT)
Facility: HOSPITAL | Age: 68
Discharge: HOME | End: 2023-11-16
Payer: MEDICARE

## 2023-11-13 VITALS — HEIGHT: 63 IN | BODY MASS INDEX: 25.34 KG/M2 | WEIGHT: 143 LBS

## 2023-11-13 DIAGNOSIS — Z12.31 VISIT FOR SCREENING MAMMOGRAM: ICD-10-CM

## 2023-11-13 PROCEDURE — 77063 BREAST TOMOSYNTHESIS BI: CPT

## 2024-11-15 ENCOUNTER — HOSPITAL ENCOUNTER (OUTPATIENT)
Facility: HOSPITAL | Age: 69
Discharge: HOME | End: 2024-11-18
Payer: MEDICARE

## 2024-11-15 VITALS — HEIGHT: 63 IN | BODY MASS INDEX: 25.69 KG/M2 | WEIGHT: 145 LBS

## 2024-11-15 DIAGNOSIS — Z12.31 VISIT FOR SCREENING MAMMOGRAM: ICD-10-CM

## 2024-11-15 PROCEDURE — 77063 BREAST TOMOSYNTHESIS BI: CPT
